# Patient Record
Sex: MALE | URBAN - METROPOLITAN AREA
[De-identification: names, ages, dates, MRNs, and addresses within clinical notes are randomized per-mention and may not be internally consistent; named-entity substitution may affect disease eponyms.]

---

## 2017-02-23 ENCOUNTER — APPOINTMENT (RX ONLY)
Age: 47
Setting detail: DERMATOLOGY
End: 2017-02-23

## 2017-02-23 DIAGNOSIS — L73.2 HIDRADENITIS SUPPURATIVA: ICD-10-CM | Status: IMPROVED

## 2017-02-23 DIAGNOSIS — L71.8 OTHER ROSACEA: ICD-10-CM | Status: STABLE

## 2017-02-23 DIAGNOSIS — Z79.899 OTHER LONG TERM (CURRENT) DRUG THERAPY: ICD-10-CM

## 2017-02-23 PROBLEM — I10 ESSENTIAL (PRIMARY) HYPERTENSION: Status: ACTIVE | Noted: 2017-02-23

## 2017-02-23 PROBLEM — L70.0 ACNE VULGARIS: Status: ACTIVE | Noted: 2017-02-23

## 2017-02-23 PROCEDURE — ? TREATMENT REGIMEN

## 2017-02-23 PROCEDURE — ? VENIPUNCTURE

## 2017-02-23 PROCEDURE — ? HUMIRA MONITORING

## 2017-02-23 PROCEDURE — ? ORDER TESTS

## 2017-02-23 PROCEDURE — 99214 OFFICE O/P EST MOD 30 MIN: CPT | Mod: 25

## 2017-02-23 PROCEDURE — 36415 COLL VENOUS BLD VENIPUNCTURE: CPT

## 2017-02-23 PROCEDURE — ? COUNSELING

## 2017-02-23 ASSESSMENT — SEVERITY ASSESSMENT OVERALL AMONG ALL PATIENTS: IN YOUR EXPERIENCE, AMONG ALL PATIENTS YOU HAVE SEEN WITH THIS CONDITION, HOW SEVERE IS THIS PATIENT'S CONDITION?: MILD

## 2017-02-23 ASSESSMENT — LOCATION ZONE DERM
LOCATION ZONE: AXILLAE
LOCATION ZONE: FACE

## 2017-02-23 ASSESSMENT — LOCATION SIMPLE DESCRIPTION DERM
LOCATION SIMPLE: RIGHT AXILLARY VAULT
LOCATION SIMPLE: RIGHT FOREHEAD
LOCATION SIMPLE: LEFT AXILLARY VAULT

## 2017-02-23 ASSESSMENT — LOCATION DETAILED DESCRIPTION DERM
LOCATION DETAILED: RIGHT MEDIAL FOREHEAD
LOCATION DETAILED: LEFT AXILLARY VAULT
LOCATION DETAILED: RIGHT AXILLARY VAULT

## 2017-02-23 NOTE — HPI: MEDICATION (HUMIRA)
Is This A New Presentation, Or A Follow-Up?: Follow Up Humira
How Severe Is It?: mild
How Many Months Of Humira Have You Completed?: 5

## 2017-10-05 ENCOUNTER — APPOINTMENT (RX ONLY)
Age: 47
Setting detail: DERMATOLOGY
End: 2017-10-05

## 2017-10-05 DIAGNOSIS — L73.2 HIDRADENITIS SUPPURATIVA: ICD-10-CM | Status: INADEQUATELY CONTROLLED

## 2017-10-05 DIAGNOSIS — L71.8 OTHER ROSACEA: ICD-10-CM | Status: STABLE

## 2017-10-05 DIAGNOSIS — Z79.899 OTHER LONG TERM (CURRENT) DRUG THERAPY: ICD-10-CM

## 2017-10-05 PROCEDURE — ? TREATMENT REGIMEN

## 2017-10-05 PROCEDURE — ? INJECTION

## 2017-10-05 PROCEDURE — 99214 OFFICE O/P EST MOD 30 MIN: CPT | Mod: 25

## 2017-10-05 PROCEDURE — ? PRESCRIPTION

## 2017-10-05 PROCEDURE — ? ORDER TESTS

## 2017-10-05 PROCEDURE — ? HUMIRA MONITORING

## 2017-10-05 PROCEDURE — ? COUNSELING

## 2017-10-05 PROCEDURE — 96372 THER/PROPH/DIAG INJ SC/IM: CPT

## 2017-10-05 RX ORDER — ADALIMUMAB 40MG/0.8ML
KIT SUBCUTANEOUS
Qty: 6 | Refills: 0 | Status: ERX

## 2017-10-05 ASSESSMENT — LOCATION DETAILED DESCRIPTION DERM
LOCATION DETAILED: PERIUMBILICAL SKIN
LOCATION DETAILED: RIGHT MEDIAL FOREHEAD
LOCATION DETAILED: LEFT LATERAL ABDOMEN
LOCATION DETAILED: LEFT INGUINAL CREASE
LOCATION DETAILED: RIGHT INGUINAL CREASE

## 2017-10-05 ASSESSMENT — LOCATION SIMPLE DESCRIPTION DERM
LOCATION SIMPLE: ABDOMEN
LOCATION SIMPLE: GROIN
LOCATION SIMPLE: RIGHT FOREHEAD

## 2017-10-05 ASSESSMENT — LOCATION ZONE DERM
LOCATION ZONE: FACE
LOCATION ZONE: TRUNK

## 2017-10-05 ASSESSMENT — SEVERITY ASSESSMENT OVERALL AMONG ALL PATIENTS: IN YOUR EXPERIENCE, AMONG ALL PATIENTS YOU HAVE SEEN WITH THIS CONDITION, HOW SEVERE IS THIS PATIENT'S CONDITION?: MILD

## 2017-10-05 NOTE — PROCEDURE: INJECTION
Units: mg
Treatment Number: 0
Expiration Date (Optional): 06/18
Administered By (Optional): DELBERT Sandra
Route: SC
Medication (1) And Associated J-Code Units: Adalimumab (Humira), 20mg
Bill J-Code: yes
Procedure Information: Please note that the numeric value listed in the Medication (1) and associated J-code units and Medication (2) and associated J-code units variables are j-code amounts and do not represent either the concentration or the total amount of the medications injected.  I strongly recommend selecting no to the Render J-code information in note question. This will allow your note to be more clear. If you are billing j-codes with your injection codes you need to document the total amount of the medication injected. This amount should match the j-code units. For example, if you are injecting Triamcinolone 40mg as an intramuscular injection you would select 40 for the dose field and mg for the units. This would allow you to document  with 4 units (40mg = 10mg x 4). The total volume is not used to calculate j-codes only the amount of the medication administered.
Detail Level: None
Post-Care Instructions: I reviewed with the patient in detail post-care instructions. Patient understands to keep the injection sites clean and call the clinic if there is any redness, swelling or pain.
Render J-Code Information In Note?: no
Consent: The risks of the medication was reviewed with the patient.
Dose Administered (Numbers Only - Mg, G, Mcg, Units, Cc): 80
Lot # (Optional): [id number]

## 2018-01-24 ENCOUNTER — APPOINTMENT (RX ONLY)
Age: 48
Setting detail: DERMATOLOGY
End: 2018-01-24

## 2018-01-24 DIAGNOSIS — L73.2 HIDRADENITIS SUPPURATIVA: ICD-10-CM

## 2018-01-24 DIAGNOSIS — L71.8 OTHER ROSACEA: ICD-10-CM | Status: STABLE

## 2018-01-24 PROCEDURE — ? COUNSELING

## 2018-01-24 PROCEDURE — 99213 OFFICE O/P EST LOW 20 MIN: CPT

## 2018-01-24 PROCEDURE — ? TREATMENT REGIMEN

## 2018-01-24 PROCEDURE — ? HUMIRA MONITORING

## 2018-01-24 ASSESSMENT — LOCATION SIMPLE DESCRIPTION DERM
LOCATION SIMPLE: GROIN
LOCATION SIMPLE: LEFT BUTTOCK
LOCATION SIMPLE: RIGHT FOREHEAD
LOCATION SIMPLE: RIGHT BUTTOCK

## 2018-01-24 ASSESSMENT — LOCATION ZONE DERM
LOCATION ZONE: TRUNK
LOCATION ZONE: FACE

## 2018-01-24 ASSESSMENT — LOCATION DETAILED DESCRIPTION DERM
LOCATION DETAILED: RIGHT MEDIAL FOREHEAD
LOCATION DETAILED: RIGHT INGUINAL CREASE
LOCATION DETAILED: LEFT MEDIAL BUTTOCK
LOCATION DETAILED: RIGHT MEDIAL BUTTOCK
LOCATION DETAILED: LEFT INGUINAL CREASE

## 2018-01-24 ASSESSMENT — SEVERITY ASSESSMENT OVERALL AMONG ALL PATIENTS: IN YOUR EXPERIENCE, AMONG ALL PATIENTS YOU HAVE SEEN WITH THIS CONDITION, HOW SEVERE IS THIS PATIENT'S CONDITION?: MILD

## 2018-04-26 ENCOUNTER — APPOINTMENT (RX ONLY)
Age: 48
Setting detail: DERMATOLOGY
End: 2018-04-26

## 2018-04-26 DIAGNOSIS — L73.2 HIDRADENITIS SUPPURATIVA: ICD-10-CM | Status: INADEQUATELY CONTROLLED

## 2018-04-26 DIAGNOSIS — L71.8 OTHER ROSACEA: ICD-10-CM | Status: STABLE

## 2018-04-26 PROCEDURE — 99214 OFFICE O/P EST MOD 30 MIN: CPT

## 2018-04-26 PROCEDURE — ? TREATMENT REGIMEN

## 2018-04-26 PROCEDURE — ? HUMIRA MONITORING

## 2018-04-26 ASSESSMENT — LOCATION DETAILED DESCRIPTION DERM
LOCATION DETAILED: RIGHT INGUINAL CREASE
LOCATION DETAILED: RIGHT INFERIOR MEDIAL FOREHEAD
LOCATION DETAILED: LEFT INGUINAL CREASE

## 2018-04-26 ASSESSMENT — LOCATION ZONE DERM
LOCATION ZONE: FACE
LOCATION ZONE: TRUNK

## 2018-04-26 ASSESSMENT — SEVERITY ASSESSMENT OVERALL AMONG ALL PATIENTS: IN YOUR EXPERIENCE, AMONG ALL PATIENTS YOU HAVE SEEN WITH THIS CONDITION, HOW SEVERE IS THIS PATIENT'S CONDITION?: MILD

## 2018-04-26 ASSESSMENT — LOCATION SIMPLE DESCRIPTION DERM
LOCATION SIMPLE: GROIN
LOCATION SIMPLE: RIGHT FOREHEAD

## 2018-04-26 ASSESSMENT — HURLEY STAGE
IN YOUR EXPERIENCE, AMONG ALL PATIENTS YOU HAVE SEEN WITH THIS CONDITION, HOW SEVERE IS THIS PATIENT'S CONDITION?: HURLEY STAGE II: SINGLE OR MULTIPLE, WIDELY SEPARATED RECURRENT ABSCESSES WITH SINUS TRACT FORMATION AND SCARRING

## 2018-04-26 NOTE — HPI: MEDICATION (HUMIRA)
Is This A New Presentation, Or A Follow-Up?: Follow Up Humira
How Severe Is It?: mild
Additional History: Patient states he was diagnosed with Pneumonia in January and d/c Humira for 2 months as per recommendation of his PCP. Patient restarted Humira in March, gave himself a loading dose (2 pens) and and once every other week since he doesn’t feel comfortable doing Humira once a week once he was diagnosed with Pneumonia, but will restart Humira weekly again. Patient states HS was well controlled until he d/c Humira and is currently flaring on the groin and buttock.

## 2018-04-26 NOTE — PROCEDURE: TREATMENT REGIMEN
Detail Level: Zone
Plan: Patient okay’d release of medical records with recent CBC and CMP
Continue Regimen: Humira, increase to 1 pen subcutaneously once a week
Detail Level: Detailed
Continue Regimen: Metrogel and Mirvaso daily. \\nDoxycycline 200mg 1 po qd

## 2018-05-30 ENCOUNTER — APPOINTMENT (RX ONLY)
Age: 48
Setting detail: DERMATOLOGY
End: 2018-05-30

## 2018-05-30 DIAGNOSIS — Z79.899 OTHER LONG TERM (CURRENT) DRUG THERAPY: ICD-10-CM

## 2018-05-30 PROCEDURE — ? ORDER TESTS

## 2019-07-11 ENCOUNTER — HOSPITAL ENCOUNTER (OUTPATIENT)
Age: 49
Discharge: HOME | End: 2019-07-11
Payer: COMMERCIAL

## 2019-07-11 LAB
INR BLD: 1.29 (ref 0.86–1.14)
PROTHROMBIN TIME: 14.7 SEC (ref 9.8–13)

## 2019-07-11 PROCEDURE — 82397 CHEMILUMINESCENT ASSAY: CPT

## 2019-07-11 PROCEDURE — 85610 PROTHROMBIN TIME: CPT

## 2019-07-11 PROCEDURE — 86140 C-REACTIVE PROTEIN: CPT

## 2019-07-11 PROCEDURE — 80074 ACUTE HEPATITIS PANEL: CPT

## 2019-07-11 PROCEDURE — 80299 QUANTITATIVE ASSAY DRUG: CPT

## 2019-07-11 PROCEDURE — 80053 COMPREHEN METABOLIC PANEL: CPT

## 2019-07-11 PROCEDURE — 85025 COMPLETE CBC W/AUTO DIFF WBC: CPT

## 2019-07-11 PROCEDURE — 36415 COLL VENOUS BLD VENIPUNCTURE: CPT

## 2019-07-12 LAB
A/G RATIO: 0.9 (ref 1.1–2.2)
ALBUMIN SERPL-MCNC: 3.6 G/DL (ref 3.4–5)
ALP BLD-CCNC: 141 U/L (ref 40–129)
ALT SERPL-CCNC: 14 U/L (ref 10–40)
ANION GAP SERPL CALCULATED.3IONS-SCNC: 14 MMOL/L (ref 3–16)
AST SERPL-CCNC: 14 U/L (ref 15–37)
BASOPHILS ABSOLUTE: 0.1 K/UL (ref 0–0.2)
BASOPHILS RELATIVE PERCENT: 1 %
BILIRUB SERPL-MCNC: <0.2 MG/DL (ref 0–1)
BUN BLDV-MCNC: 13 MG/DL (ref 7–20)
C-REACTIVE PROTEIN: 84.8 MG/L (ref 0–5.1)
CALCIUM SERPL-MCNC: 9.5 MG/DL (ref 8.3–10.6)
CHLORIDE BLD-SCNC: 100 MMOL/L (ref 99–110)
CO2: 22 MMOL/L (ref 21–32)
CREAT SERPL-MCNC: 1 MG/DL (ref 0.9–1.3)
EOSINOPHILS ABSOLUTE: 0.3 K/UL (ref 0–0.6)
EOSINOPHILS RELATIVE PERCENT: 3.5 %
GFR AFRICAN AMERICAN: >60
GFR NON-AFRICAN AMERICAN: >60
GLOBULIN: 4.2 G/DL
GLUCOSE BLD-MCNC: 73 MG/DL (ref 70–99)
HAV IGM SER IA-ACNC: NORMAL
HCT VFR BLD CALC: 30.6 % (ref 40.5–52.5)
HEMOGLOBIN: 9.8 G/DL (ref 13.5–17.5)
HEPATITIS B CORE IGM ANTIBODY: NORMAL
HEPATITIS B SURFACE ANTIGEN INTERPRETATION: NORMAL
HEPATITIS C ANTIBODY INTERPRETATION: NORMAL
LYMPHOCYTES ABSOLUTE: 1.1 K/UL (ref 1–5.1)
LYMPHOCYTES RELATIVE PERCENT: 12.8 %
MCH RBC QN AUTO: 23.9 PG (ref 26–34)
MCHC RBC AUTO-ENTMCNC: 32.1 G/DL (ref 31–36)
MCV RBC AUTO: 74.6 FL (ref 80–100)
MONOCYTES ABSOLUTE: 0.6 K/UL (ref 0–1.3)
MONOCYTES RELATIVE PERCENT: 7.3 %
NEUTROPHILS ABSOLUTE: 6.6 K/UL (ref 1.7–7.7)
NEUTROPHILS RELATIVE PERCENT: 75.4 %
PDW BLD-RTO: 17 % (ref 12.4–15.4)
PLATELET # BLD: 492 K/UL (ref 135–450)
PMV BLD AUTO: 7 FL (ref 5–10.5)
POTASSIUM SERPL-SCNC: 4.2 MMOL/L (ref 3.5–5.1)
RBC # BLD: 4.1 M/UL (ref 4.2–5.9)
SODIUM BLD-SCNC: 136 MMOL/L (ref 136–145)
TOTAL PROTEIN: 7.8 G/DL (ref 6.4–8.2)
WBC # BLD: 8.7 K/UL (ref 4–11)

## 2019-07-13 LAB — MISCELLANEOUS LAB TEST ORDER: NORMAL

## 2019-08-16 ENCOUNTER — HOSPITAL ENCOUNTER (OUTPATIENT)
Dept: MRI IMAGING | Age: 49
Discharge: HOME | End: 2019-08-16
Payer: COMMERCIAL

## 2019-08-16 DIAGNOSIS — M45.6 ANKYLOSING SPONDYLITIS LUMBAR REGION (HCC): ICD-10-CM

## 2019-08-16 DIAGNOSIS — M54.2 CERVICALGIA: ICD-10-CM

## 2019-08-16 PROCEDURE — 72141 MRI NECK SPINE W/O DYE: CPT

## 2019-08-16 PROCEDURE — 72148 MRI LUMBAR SPINE W/O DYE: CPT

## 2019-10-24 ENCOUNTER — HOSPITAL ENCOUNTER (OUTPATIENT)
Age: 49
Discharge: HOME | End: 2019-10-24
Payer: COMMERCIAL

## 2019-10-24 LAB
INR BLD: 1.25 (ref 0.86–1.14)
PROTHROMBIN TIME: 14.2 SEC (ref 9.8–13)

## 2019-10-24 PROCEDURE — 83690 ASSAY OF LIPASE: CPT

## 2019-10-24 PROCEDURE — 85025 COMPLETE CBC W/AUTO DIFF WBC: CPT

## 2019-10-24 PROCEDURE — 80053 COMPREHEN METABOLIC PANEL: CPT

## 2019-10-24 PROCEDURE — 85610 PROTHROMBIN TIME: CPT

## 2019-10-25 LAB
A/G RATIO: 0.8 (ref 1.1–2.2)
ALBUMIN SERPL-MCNC: 3.5 G/DL (ref 3.4–5)
ALP BLD-CCNC: 134 U/L (ref 40–129)
ALT SERPL-CCNC: 11 U/L (ref 10–40)
ANION GAP SERPL CALCULATED.3IONS-SCNC: 13 MMOL/L (ref 3–16)
AST SERPL-CCNC: 12 U/L (ref 15–37)
BASOPHILS ABSOLUTE: 0.1 K/UL (ref 0–0.2)
BASOPHILS RELATIVE PERCENT: 1 %
BILIRUB SERPL-MCNC: 0.3 MG/DL (ref 0–1)
BUN BLDV-MCNC: 10 MG/DL (ref 7–20)
CALCIUM SERPL-MCNC: 9.1 MG/DL (ref 8.3–10.6)
CHLORIDE BLD-SCNC: 98 MMOL/L (ref 99–110)
CO2: 25 MMOL/L (ref 21–32)
CREAT SERPL-MCNC: 1 MG/DL (ref 0.9–1.3)
EOSINOPHILS ABSOLUTE: 0.3 K/UL (ref 0–0.6)
EOSINOPHILS RELATIVE PERCENT: 3.1 %
GFR AFRICAN AMERICAN: >60
GFR NON-AFRICAN AMERICAN: >60
GLOBULIN: 4.6 G/DL
GLUCOSE BLD-MCNC: 91 MG/DL (ref 70–99)
HCT VFR BLD CALC: 35.8 % (ref 40.5–52.5)
HEMOGLOBIN: 11.7 G/DL (ref 13.5–17.5)
LIPASE: 42 U/L (ref 13–60)
LYMPHOCYTES ABSOLUTE: 1 K/UL (ref 1–5.1)
LYMPHOCYTES RELATIVE PERCENT: 10.2 %
MCH RBC QN AUTO: 25.5 PG (ref 26–34)
MCHC RBC AUTO-ENTMCNC: 32.7 G/DL (ref 31–36)
MCV RBC AUTO: 78.1 FL (ref 80–100)
MONOCYTES ABSOLUTE: 0.7 K/UL (ref 0–1.3)
MONOCYTES RELATIVE PERCENT: 6.8 %
NEUTROPHILS ABSOLUTE: 7.6 K/UL (ref 1.7–7.7)
NEUTROPHILS RELATIVE PERCENT: 78.9 %
PDW BLD-RTO: 17.9 % (ref 12.4–15.4)
PLATELET # BLD: 426 K/UL (ref 135–450)
PMV BLD AUTO: 7.5 FL (ref 5–10.5)
POTASSIUM SERPL-SCNC: 4.7 MMOL/L (ref 3.5–5.1)
RBC # BLD: 4.58 M/UL (ref 4.2–5.9)
SODIUM BLD-SCNC: 136 MMOL/L (ref 136–145)
TOTAL PROTEIN: 8.1 G/DL (ref 6.4–8.2)
WBC # BLD: 9.7 K/UL (ref 4–11)

## 2019-12-27 ENCOUNTER — HOSPITAL ENCOUNTER (OUTPATIENT)
Age: 49
Discharge: HOME | End: 2019-12-27
Payer: COMMERCIAL

## 2019-12-27 PROCEDURE — 36415 COLL VENOUS BLD VENIPUNCTURE: CPT

## 2021-11-16 NOTE — FLOWSHEET NOTE
[facility], [address] [place] [address] [address]  Phone: [phone]   Fax:     [phone]      Physical Therapy Treatment Note/ Progress Report:     Date:  2021    Patient Name:  Ammon Fonseca    :  1970  MRN: [id number]  Restrictions/Precautions:    Medical/Treatment Diagnosis Information:  ·  M54.6  Thoracic pain  ·  M54.5  Lumbar pain  · M25.512 L Shoulder pain  Insurance/Certification information:    [facility]  Physician Information:    Hunter Samuel MD  Has the plan of care been signed (Y/N):        []  Yes  [x]  No     Date of Patient follow up with Physician: NS    Is this a Progress Report:     []  Yes  [x]  No     If Yes:  Date Range for reporting period:  Beginnin21 ------------ Endin21    Progress report will be due (10 Rx or 30 days whichever is less):      Recertification will be due (POC Duration  / 90 days whichever is less): 21      Visit # Insurance Allowable Auth Required   In Person 1  []  Yes     []  No    Tele Health 0  []  Yes     []  No    Total 1       Functional Scale: Oswestry 30%   Date assessed:  21      Latex Allergy:  [x]NO      []YES  Preferred Language for Healthcare:   [x]English       []other:    Pain level:  3-4/10     SUBJECTIVE:  See eval    OBJECTIVE: See eval   Observation:    Test measurements:      RESTRICTIONS/PRECAUTIONS: colonoscomy bag  B THR    Exercises/Interventions:   Therapeutic Ex (38662) Sets/sec Reps Notes/CUES HEP   Prone/ILDEFONSO  5 min  x   Prone buttock kicks  15x  x   Prone glut sets  15x  x          Chin tucks   15x  x                                                    Manual Intervention (39647)       Prone leg pull  5 min                                        NMR re-education (44406)   CUES NEEDED    PB rows BL 20x  x                 bridges  15x  x   Supine PB clamshells BL 15x  x          Wall squats  15x  x Therapeutic Activity (16709)                     HP T/L  10 min                   [employer] access code: [email or web address]           Therapeutic Exercise and NMR EXR  [x] (35780) Provided verbal/tactile cueing for activities related to strengthening, flexibility, endurance, ROM  for improvements in scapular, scapulothoracic and UE control with self care, reaching, carrying, lifting, house/yardwork, driving/computer work.    [] (55007) Provided verbal/tactile cueing for activities related to improving balance, coordination, kinesthetic sense, posture, motor skill, proprioception  to assist with  scapular, scapulothoracic and UE control with self care, reaching, carrying, lifting, house/yardwork, driving/computer work. Therapeutic Activities:    [x] (66736 or 13017) Provided verbal/tactile cueing for activities related to improving balance, coordination, kinesthetic sense, posture, motor skill, proprioception and motor activation to allow for proper function of scapular, scapulothoracic and UE control with self care, carrying, lifting, driving/computer work.      Home Exercise Program:    [x] (15287) Reviewed/Progressed HEP activities related to strengthening, flexibility, endurance, ROM of scapular, scapulothoracic and UE control with self care, reaching, carrying, lifting, house/yardwork, driving/computer work  [] (47355) Reviewed/Progressed HEP activities related to improving balance, coordination, kinesthetic sense, posture, motor skill, proprioception of scapular, scapulothoracic and UE control with self care, reaching, carrying, lifting, house/yardwork, driving/computer work      Manual Treatments:  PROM / STM / Oscillations-Mobs:  G-I, II, III, IV (PA's, Inf., Post.)  [x] (45339) Provided manual therapy to mobilize soft tissue/joints of cervical/CT, scapular GHJ and UE for the purpose of modulating pain, promoting relaxation,  increasing ROM, reducing/eliminating soft tissue swelling/inflammation/restriction, increased symptoms or restriction. [x] Progressing: [] Met: [] Not Met: [] Adjusted     5. Stand/ posture with min limitations (patient specific functional goal)    [x] Progressing: [] Met: [] Not Met: [] Adjusted            Overall Progression Towards Functional goals/ Treatment Progress Update:  [] Patient is progressing as expected towards functional goals listed. [] Progression is slowed due to complexities/Impairments listed. [] Progression has been slowed due to co-morbidities. [x] Plan just implemented, too soon to assess goals progression <30days   [] Goals require adjustment due to lack of progress  [] Patient is not progressing as expected and requires additional follow up with physician  [] Other    Prognosis for POC: [x] Good [] Fair  [] Poor      Patient requires continued skilled intervention: [x] Yes  [] No    Treatment/Activity Tolerance:  [x] Patient able to complete treatment  [] Patient limited by fatigue  [] Patient limited by pain    [] Patient limited by other medical complications  [] Other:       PLAN: See eval  [] Continue per plan of care [] Alter current plan (see comments above)  [x] Plan of care initiated [] Hold pending MD visit [] Discharge    Electronically signed by:  Aren Dean PT    Note: If patient does not return for scheduled/ recommended follow up visits, this note will serve as a discharge from care along with most recent update on progress.

## 2021-11-16 NOTE — PLAN OF CARE
[facility], [address] [facility] [address], [address]  Phone: [phone]   Fax:     [phone]                                                       Physical Therapy Certification    Dear   Eligio Beaulieu MD,    We had the pleasure of evaluating the following patient for physical therapy services at [address] [place] [address]. A summary of our findings can be found in the initial assessment below. This includes our plan of care. If you have any questions or concerns regarding these findings, please do not hesitate to contact me at the office phone number checked above. Thank you for the referral.       Physician Signature:_______________________________Date:__________________  By signing above (or electronic signature), therapists plan is approved by physician      Patient: Rashmi Smith   : 1970   MRN: [id number]  Referring Physician:   Eligio Beaulieu MD      Evaluation Date: 2021      Medical Diagnosis Information:  ·   M54.6  Thoracic pain  ·  M54.5  Lumbar pain  M25.512 L Shoulder pain                                             Insurance information:   [facility]    Precautions/ Contra-indications: none      C-SSRS Triggered by Intake questionnaire (Past 2 wk assessment):   [x] No, Questionnaire did not trigger screening.   [] Yes, Patient intake triggered further evaluation      [] C-SSRS Screening completed  [] PCP notified via Plan of Care  [] Emergency services notified     Latex Allergy:  [x]NO      []YES  Preferred Language for Healthcare:   [x]English       []other:    SUBJECTIVE: Patient states insidious onset L shoulder and L/T/ C pain  Greater than 10 years.     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 []Reduced participation in sport/recreation activities. Classification:   []Signs/symptoms consistent with post-surgical status including decreased ROM, strength and function.   []Signs/symptoms consistent with joint sprain/strain   []Signs/symptoms consistent with shoulder impingement   []Signs/symptoms consistent with shoulder/elbow/wrist tendinopathy   []Signs/symptoms consistent with Rotator cuff tear   []Signs/symptoms consistent with labral tear   []Signs/symptoms consistent with postural dysfunction    []Signs/symptoms consistent with Glenohumeral IR Deficit - <45 degrees   []Signs/symptoms consistent with facet dysfunction of cervical/thoracic spine    []Signs/symptoms consistent with pathology which may benefit from Dry needling     [x]other: T/L DDD/ pain    Prognosis/Rehab Potential:      []Excellent   [x]Good    []Fair   []Poor    Tolerance of evaluation/treatment:    []Excellent   [x]Good    []Fair   []Poor    Physical Therapy Evaluation Complexity Justification  [x] A history of present problem with:  [] no personal factors and/or comorbidities that impact the plan of care;  [x]1-2 personal factors and/or comorbidities that impact the plan of care  []3 personal factors and/or comorbidities that impact the plan of care  [x] An examination of body systems using standardized tests and measures addressing any of the following: body structures and functions (impairments), activity limitations, and/or participation restrictions;:  [x] a total of 1-2 or more elements   [] a total of 3 or more elements   [] a total of 4 or more elements   [x] A clinical presentation with:  [x] stable and/or uncomplicated characteristics   [] evolving clinical presentation with changing characteristics  [] unstable and unpredictable characteristics;   [x] Clinical decision making of [x] low, [] moderate, [] high complexity using standardized patient assessment instrument and/or measurable assessment of functional outcome. [x] EVAL (LOW) 79554 (typically 20 minutes face-to-face)  [] EVAL (MOD) 96830 (typically 30 minutes face-to-face)  [] EVAL (HIGH) 29765 (typically 45 minutes face-to-face)  [] RE-EVAL     PLAN:  Frequency/Duration:  1-2days per week for 6-8 Weeks:  INTERVENTIONS:  [x] Therapeutic exercise including: strength training, ROM, for Upper extremity and core   [x]  NMR activation and proprioception for UE, scap and Core   [x] Manual therapy as indicated for shoulder, scapula and spine to include: Dry Needling/IASTM, STM, PROM, Gr I-IV mobilizations, manipulation. [x] Modalities as needed that may include: thermal agents, E-stim, Biofeedback, US, iontophoresis as indicated  [x] Patient education on joint protection, postural re-education, activity modification, progression of HEP. HEP instruction: Refer to [address] [place] [address] access code and exercises on the 1st visit treatment note    GOALS:  Patient stated goal: posture/ stand     Therapist goals for Patient:   Short Term Goals: To be achieved in: 2 weeks  1. Independent in HEP and progression per patient tolerance, in order to prevent re-injury. [x] Progressing: [] Met: [] Not Met: [] Adjusted     2. Patient will have a decrease in pain to facilitate improvement in movement, function, and ADLs as indicated by Functional Deficits. [x] Progressing: [] Met: [] Not Met: [] Adjusted     Long Term Goals: To be achieved in: 6-8 weeks  1. Disability index score of 15% or less for the DASH to assist with reaching prior level of function. [x] Progressing: [] Met: [] Not Met: [] Adjusted     2. Patient will demonstrate increased AROM to WNL to allow for proper joint functioning as indicated by patients Functional Deficits. [x] Progressing: [] Met: [] Not Met: [] Adjusted     3. Patient will demonstrate an increase in Strength to 5/5 to allow for proper functional mobility as indicated by patients Functional Deficits. [x] Progressing: [] Met: [] Not Met: [] Adjusted     4. Patient will return to [facility] for  functional activities without increased symptoms or restriction. [x] Progressing: [] Met: [] Not Met: [] Adjusted     5.  Stand/ posture with min limitations (patient specific functional goal)    [x] Progressing: [] Met: [] Not Met: [] Adjusted      Electronically signed by:  Kwan Alves PT

## 2021-11-17 ENCOUNTER — HOSPITAL ENCOUNTER (OUTPATIENT)
Dept: PHYSICAL THERAPY | Age: 51
Setting detail: THERAPIES SERIES
Discharge: HOME | End: 2021-11-17
Payer: COMMERCIAL

## 2021-11-17 PROCEDURE — 97110 THERAPEUTIC EXERCISES: CPT | Performed by: SPECIALIST

## 2021-11-17 PROCEDURE — 97112 NEUROMUSCULAR REEDUCATION: CPT | Performed by: SPECIALIST

## 2021-11-17 PROCEDURE — 97161 PT EVAL LOW COMPLEX 20 MIN: CPT | Performed by: SPECIALIST

## 2021-12-01 ENCOUNTER — HOSPITAL ENCOUNTER (OUTPATIENT)
Dept: PHYSICAL THERAPY | Age: 51
Setting detail: THERAPIES SERIES
Discharge: HOME | End: 2021-12-01
Payer: COMMERCIAL

## 2021-12-01 PROCEDURE — 97112 NEUROMUSCULAR REEDUCATION: CPT | Performed by: SPECIALIST/TECHNOLOGIST

## 2021-12-01 PROCEDURE — 97110 THERAPEUTIC EXERCISES: CPT | Performed by: SPECIALIST/TECHNOLOGIST

## 2021-12-01 NOTE — FLOWSHEET NOTE
[facility], [address] [place] [address], [address] [place] [address] [address]  Phone: [phone]   Fax:     [phone]      Physical Therapy Treatment Note/ Progress Report:     Date:  2021    Patient Name:  Mode Yanes    :  1970  MRN: [id number]  Restrictions/Precautions:    Medical/Treatment Diagnosis Information:  ·  M54.6  Thoracic pain  ·  M54.5  Lumbar pain  · M25.512 L Shoulder pain  Insurance/Certification information:    R  Physician Information:    Emma Brown MD  Has the plan of care been signed (Y/N):        []  Yes  [x]  No     Date of Patient follow up with Physician: NS    Is this a Progress Report:     []  Yes  [x]  No     If Yes:  Date Range for reporting period:  Beginnin21 ------------ Endin21    Progress report will be due (10 Rx or 30 days whichever is less):      Recertification will be due (POC Duration  / 90 days whichever is less): 21      Visit # Insurance Allowable Auth Required   In Person 2  []  Yes     []  No    Tele Health 0  []  Yes     []  No    Total 2       Functional Scale: Oswestry 30%   Date assessed:  21      Latex Allergy:  [x]NO      []YES  Preferred Language for Healthcare:   [x]English       []other:    Pain level:  3-4/10     SUBJECTIVE:  Pt notes that he has not been consistent with his HEP. Overall he is feeling ok.      OBJECTIVE:    Observation:    Test measurements:      RESTRICTIONS/PRECAUTIONS: colonoscomy bag  B THR    Exercises/Interventions:   Therapeutic Ex (67785) Sets/sec Reps Notes/CUES HEP   Prone/ILDEFONSO  5 min  x   Prone buttock kicks  20x  x   Prone glut sets  20x  x          Chin tucks   20x  x          Prone press up   x10                                        Manual Intervention (26974)       Prone leg pull  5 min                                        NMR re-education (89195)   CUES NEEDED    PB rows BL 20x  x relaxation,  increasing ROM, reducing/eliminating soft tissue swelling/inflammation/restriction, improving soft tissue extensibility and allowing for proper ROM for normal function with self care, reaching, carrying, lifting, house/yardwork, driving/computer work    Modalities:     [x] GAME READY (VASO)- for significant edema, swelling, pain control. Charges:  Timed Code Treatment Minutes: 40   Total Treatment Minutes:  50   BWC:  TE TIME:  NMR TIME:  MANUAL TIME:  UNTIMED MINUTES:  Medicare Total:         [] EVAL (LOW) 12662 (typically 20 minutes face-to-face)  [] EVAL (MOD) 02976 (typically 30 minutes face-to-face)  [] EVAL (HIGH) 39628 (typically 45 minutes face-to-face)  [] RE-EVAL     [x] QI(56895) x  2  [] IONTO  [x] NMR (03938) x  1   [] VASO  [] Manual (02190) x     [x] Other:HP  [] TA x      [] Mech Traction (20027)  [] ES(attended) (80316)      [] ES (un) (74000):    ASSESSMENT:  Tolerated tx well. GOALS:      Patient stated goal: posture/ stand     Therapist goals for Patient:   Short Term Goals: To be achieved in: 2 weeks  1. Independent in HEP and progression per patient tolerance, in order to prevent re-injury. [x] Progressing: [] Met: [] Not Met: [] Adjusted     2. Patient will have a decrease in pain to facilitate improvement in movement, function, and ADLs as indicated by Functional Deficits. [x] Progressing: [] Met: [] Not Met: [] Adjusted     Long Term Goals: To be achieved in: 6-8 weeks  1. Disability index score of 15% or less for the DASH to assist with reaching prior level of function. [x] Progressing: [] Met: [] Not Met: [] Adjusted     2. Patient will demonstrate increased AROM to WNL to allow for proper joint functioning as indicated by patients Functional Deficits. [x] Progressing: [] Met: [] Not Met: [] Adjusted     3. Patient will demonstrate an increase in Strength to 5/5 to allow for proper functional mobility as indicated by patients Functional Deficits.    [x] Progressing: [] Met: [] Not Met: [] Adjusted     4. Patient will return to [facility] for  functional activities without increased symptoms or restriction. [x] Progressing: [] Met: [] Not Met: [] Adjusted     5. Stand/ posture with min limitations (patient specific functional goal)    [x] Progressing: [] Met: [] Not Met: [] Adjusted            Overall Progression Towards Functional goals/ Treatment Progress Update:  [] Patient is progressing as expected towards functional goals listed. [] Progression is slowed due to complexities/Impairments listed. [] Progression has been slowed due to co-morbidities. [x] Plan just implemented, too soon to assess goals progression <30days   [] Goals require adjustment due to lack of progress  [] Patient is not progressing as expected and requires additional follow up with physician  [] Other    Prognosis for POC: [x] Good [] Fair  [] Poor      Patient requires continued skilled intervention: [x] Yes  [] No    Treatment/Activity Tolerance:  [x] Patient able to complete treatment  [] Patient limited by fatigue  [] Patient limited by pain    [] Patient limited by other medical complications  [] Other:       PLAN: See eval  [] Continue per plan of care [] Alter current plan (see comments above)  [x] Plan of care initiated [] Hold pending MD visit [] Discharge    Electronically signed by:  Miky Gandara PTA , ATC  Note: If patient does not return for scheduled/ recommended follow up visits, this note will serve as a discharge from care along with most recent update on progress.

## 2021-12-03 ENCOUNTER — HOSPITAL ENCOUNTER (OUTPATIENT)
Dept: PHYSICAL THERAPY | Age: 51
Setting detail: THERAPIES SERIES
End: 2021-12-03
Payer: COMMERCIAL

## 2021-12-07 ENCOUNTER — HOSPITAL ENCOUNTER (OUTPATIENT)
Dept: PHYSICAL THERAPY | Age: 51
Setting detail: THERAPIES SERIES
Discharge: HOME | End: 2021-12-07
Payer: COMMERCIAL

## 2021-12-07 NOTE — FLOWSHEET NOTE
[facility], [address] [place] [address], [address] [place] [address] [address]  Phone: [phone]   Fax:     [phone]    Physical Therapy  Cancellation/No-show Note  Patient Name:  Bindu Ellington  :  1970   Date:  2021    Cancelled visits to date: 0  No-shows to date: 1    For today's appointment patient:  []  Cancelled  []  Rescheduled appointment  [x]  No-show     Reason given by patient:  []  Patient ill  []  Conflicting appointment  []  No transportation    []  Conflict with work  [x]  No reason given  []  Other:     Comments:      Phone call information:   []  Phone call made today to patient at am/pm at the number provided:      []  Patient answered, conversation as follows:    []  Patient did not answer, message left as follows:  [x]  Phone call not needed - pt contacted us to cancel and provided reason for cancellation.      Electronically signed by:  Geovanna Marin PTA, ATC

## 2021-12-10 ENCOUNTER — HOSPITAL ENCOUNTER (OUTPATIENT)
Dept: PHYSICAL THERAPY | Age: 51
Setting detail: THERAPIES SERIES
Discharge: HOME | End: 2021-12-10
Payer: COMMERCIAL

## 2021-12-10 PROCEDURE — 97112 NEUROMUSCULAR REEDUCATION: CPT | Performed by: SPECIALIST/TECHNOLOGIST

## 2021-12-10 PROCEDURE — 97140 MANUAL THERAPY 1/> REGIONS: CPT | Performed by: SPECIALIST/TECHNOLOGIST

## 2021-12-10 PROCEDURE — 97110 THERAPEUTIC EXERCISES: CPT | Performed by: SPECIALIST/TECHNOLOGIST

## 2021-12-10 NOTE — FLOWSHEET NOTE
[facility], [address] [place] [address], [address] [place] [address] [address]  Phone: [phone]   Fax:     [phone]      Physical Therapy Treatment Note/ Progress Report:     Date:  12/10/2021    Patient Name:  Hamilton Shah    :  1970  MRN: [id number]  Restrictions/Precautions:    Medical/Treatment Diagnosis Information:  ·  M54.6  Thoracic pain  ·  M54.5  Lumbar pain  · M25.512 L Shoulder pain  Insurance/Certification information:    [facility]  Physician Information:    Nikki Alegria MD  Has the plan of care been signed (Y/N):        []  Yes  [x]  No     Date of Patient follow up with Physician: NS    Is this a Progress Report:     []  Yes  [x]  No     If Yes:  Date Range for reporting period:  Beginnin21 ------------ Endin21    Progress report will be due (10 Rx or 30 days whichever is less):      Recertification will be due (POC Duration  / 90 days whichever is less): 21      Visit # Insurance Allowable Auth Required   In Person 3  []  Yes     []  No    Tele Health 0  []  Yes     []  No    Total 3       Functional Scale: Oswestry 30%   Date assessed:  21      Latex Allergy:  [x]NO      []YES  Preferred Language for Healthcare:   [x]English       []other:    Pain level:  3-4/10     SUBJECTIVE:  Pt notes that he has been feeling ok. Notes he had a massage yesterday.      OBJECTIVE:    Observation:    Test measurements:      RESTRICTIONS/PRECAUTIONS: colonoscomy bag  B THR    Exercises/Interventions:   Therapeutic Ex (49013) Sets/sec Reps Notes/CUES HEP   Prone/ILDEFONSO  5 min  x   Prone buttock kicks  20x  x   Prone glut sets  20x  x          Chin tucks   20x  x          Prone press up   x20                                        Manual Intervention (80727)       Prone leg pull  5 min                                        NMR re-education (28271)   CUES NEEDED    PB rows BL 20x  x                 bridges  15x x   Supine PB clamshells BL 20x  x          Wall squats  15x  x                 Therapeutic Activity (12682)                     HP T/L  10 min                   Naytev access code: [email or web address]           Therapeutic Exercise and NMR EXR  [x] (94687) Provided verbal/tactile cueing for activities related to strengthening, flexibility, endurance, ROM  for improvements in scapular, scapulothoracic and UE control with self care, reaching, carrying, lifting, house/yardwork, driving/computer work.    [] (49893) Provided verbal/tactile cueing for activities related to improving balance, coordination, kinesthetic sense, posture, motor skill, proprioception  to assist with  scapular, scapulothoracic and UE control with self care, reaching, carrying, lifting, house/yardwork, driving/computer work. Therapeutic Activities:    [x] (49637 or 03888) Provided verbal/tactile cueing for activities related to improving balance, coordination, kinesthetic sense, posture, motor skill, proprioception and motor activation to allow for proper function of scapular, scapulothoracic and UE control with self care, carrying, lifting, driving/computer work.      Home Exercise Program:    [x] (31668) Reviewed/Progressed HEP activities related to strengthening, flexibility, endurance, ROM of scapular, scapulothoracic and UE control with self care, reaching, carrying, lifting, house/yardwork, driving/computer work  [] (85308) Reviewed/Progressed HEP activities related to improving balance, coordination, kinesthetic sense, posture, motor skill, proprioception of scapular, scapulothoracic and UE control with self care, reaching, carrying, lifting, house/yardwork, driving/computer work      Manual Treatments:  PROM / STM / Oscillations-Mobs:  G-I, II, III, IV (PA's, Inf., Post.)  [x] (49656) Provided manual therapy to mobilize soft tissue/joints of cervical/CT, scapular GHJ and UE for the purpose of modulating pain, promoting relaxation,  increasing ROM, reducing/eliminating soft tissue swelling/inflammation/restriction, improving soft tissue extensibility and allowing for proper ROM for normal function with self care, reaching, carrying, lifting, house/yardwork, driving/computer work    Modalities:     [x] GAME READY (VASO)- for significant edema, swelling, pain control. Charges:  Timed Code Treatment Minutes: 40   Total Treatment Minutes:  50   BWC:  TE TIME:  NMR TIME:  MANUAL TIME:  UNTIMED MINUTES:  Medicare Total:         [] EVAL (LOW) 79869 (typically 20 minutes face-to-face)  [] EVAL (MOD) 64781 (typically 30 minutes face-to-face)  [] EVAL (HIGH) 60870 (typically 45 minutes face-to-face)  [] RE-EVAL     [x] SM(48766) x  2  [] IONTO  [x] NMR (27019) x  1   [] VASO  [] Manual (52687) x     [x] Other:HP  [] TA x      [] Mech Traction (58007)  [] ES(attended) (23470)      [] ES (un) (41147):    ASSESSMENT:  Tolerated tx well. GOALS:      Patient stated goal: posture/ stand     Therapist goals for Patient:   Short Term Goals: To be achieved in: 2 weeks  1. Independent in HEP and progression per patient tolerance, in order to prevent re-injury. [x] Progressing: [] Met: [] Not Met: [] Adjusted     2. Patient will have a decrease in pain to facilitate improvement in movement, function, and ADLs as indicated by Functional Deficits. [x] Progressing: [] Met: [] Not Met: [] Adjusted     Long Term Goals: To be achieved in: 6-8 weeks  1. Disability index score of 15% or less for the DASH to assist with reaching prior level of function. [x] Progressing: [] Met: [] Not Met: [] Adjusted     2. Patient will demonstrate increased AROM to WNL to allow for proper joint functioning as indicated by patients Functional Deficits. [x] Progressing: [] Met: [] Not Met: [] Adjusted     3. Patient will demonstrate an increase in Strength to 5/5 to allow for proper functional mobility as indicated by patients Functional Deficits.    [x] Progressing: [] Met: [] Not Met: [] Adjusted     4. Patient will return to [facility] for  functional activities without increased symptoms or restriction. [x] Progressing: [] Met: [] Not Met: [] Adjusted     5. Stand/ posture with min limitations (patient specific functional goal)    [x] Progressing: [] Met: [] Not Met: [] Adjusted            Overall Progression Towards Functional goals/ Treatment Progress Update:  [] Patient is progressing as expected towards functional goals listed. [] Progression is slowed due to complexities/Impairments listed. [] Progression has been slowed due to co-morbidities. [x] Plan just implemented, too soon to assess goals progression <30days   [] Goals require adjustment due to lack of progress  [] Patient is not progressing as expected and requires additional follow up with physician  [] Other    Prognosis for POC: [x] Good [] Fair  [] Poor      Patient requires continued skilled intervention: [x] Yes  [] No    Treatment/Activity Tolerance:  [x] Patient able to complete treatment  [] Patient limited by fatigue  [] Patient limited by pain    [] Patient limited by other medical complications  [] Other:       PLAN: See eval  [] Continue per plan of care [] Alter current plan (see comments above)  [x] Plan of care initiated [] Hold pending MD visit [] Discharge    Electronically signed by:  Amanda Bauman PTA , ATC  Note: If patient does not return for scheduled/ recommended follow up visits, this note will serve as a discharge from care along with most recent update on progress.

## 2021-12-16 ENCOUNTER — HOSPITAL ENCOUNTER (OUTPATIENT)
Dept: PHYSICAL THERAPY | Age: 51
Setting detail: THERAPIES SERIES
Discharge: HOME | End: 2021-12-16
Payer: COMMERCIAL

## 2021-12-16 PROCEDURE — 97112 NEUROMUSCULAR REEDUCATION: CPT | Performed by: SPECIALIST/TECHNOLOGIST

## 2021-12-16 PROCEDURE — 97110 THERAPEUTIC EXERCISES: CPT | Performed by: SPECIALIST/TECHNOLOGIST

## 2021-12-16 NOTE — FLOWSHEET NOTE
[address] [facility], [address] [place] [address], [address] [place] [address] [address]  Phone: [phone]   Fax:     [phone]      Physical Therapy Treatment Note/ Progress Report:     Date:  2021    Patient Name:  Hilaria Díaz    :  1970  MRN: [id number]  Restrictions/Precautions:    Medical/Treatment Diagnosis Information:  ·  M54.6  Thoracic pain  ·  M54.5  Lumbar pain  · M25.512 L Shoulder pain  Insurance/Certification information:    R  Physician Information:    Millcient Moreau MD  Has the plan of care been signed (Y/N):        []  Yes  [x]  No     Date of Patient follow up with Physician: NS    Is this a Progress Report:     []  Yes  [x]  No     If Yes:  Date Range for reporting period:  Beginnin21 ------------ Endin21    Progress report will be due (10 Rx or 30 days whichever is less):      Recertification will be due (POC Duration  / 90 days whichever is less): 21      Visit # Insurance Allowable Auth Required   In Person 4  []  Yes     []  No    Tele Health 0  []  Yes     []  No    Total 4       Functional Scale: Oswestry 30%   Date assessed:  21      Latex Allergy:  [x]NO      []YES  Preferred Language for Healthcare:   [x]English       []other:    Pain level:  3-4/10     SUBJECTIVE:  Pt notes that his hip has been bothering him. Notes he has been doing his HEP.      OBJECTIVE:    Observation:    Test measurements:      RESTRICTIONS/PRECAUTIONS: colonoscomy bag  B THR    Exercises/Interventions:   Therapeutic Ex (20757) Sets/sec Reps Notes/CUES HEP    x    x    x          Chin tucks   20x  x              LTR 10\" x10     Posterior pelvic tilt  10\" x10     Posterior tilt with alt steps  x10      Posterior tilt with arms oh x10      Fig 4 stretch 10\"  x10 ea     Manual Intervention (80642)       Prone leg pull  5 min                                        NMR re-education (04967)   CUES NEEDED PB rows BL 20x  x                 bridges  15x  x   Supine PB clamshells BL 20x  x          Wall squats  15x  x                 Therapeutic Activity (64623)                     HP T/L  10 min                   Medbridge access code: [email or web address]           Therapeutic Exercise and NMR EXR  [x] (54408) Provided verbal/tactile cueing for activities related to strengthening, flexibility, endurance, ROM  for improvements in scapular, scapulothoracic and UE control with self care, reaching, carrying, lifting, house/yardwork, driving/computer work.    [] (00147) Provided verbal/tactile cueing for activities related to improving balance, coordination, kinesthetic sense, posture, motor skill, proprioception  to assist with  scapular, scapulothoracic and UE control with self care, reaching, carrying, lifting, house/yardwork, driving/computer work. Therapeutic Activities:    [x] (93720 or 55812) Provided verbal/tactile cueing for activities related to improving balance, coordination, kinesthetic sense, posture, motor skill, proprioception and motor activation to allow for proper function of scapular, scapulothoracic and UE control with self care, carrying, lifting, driving/computer work.      Home Exercise Program:    [x] (37686) Reviewed/Progressed HEP activities related to strengthening, flexibility, endurance, ROM of scapular, scapulothoracic and UE control with self care, reaching, carrying, lifting, house/yardwork, driving/computer work  [] (54537) Reviewed/Progressed HEP activities related to improving balance, coordination, kinesthetic sense, posture, motor skill, proprioception of scapular, scapulothoracic and UE control with self care, reaching, carrying, lifting, house/yardwork, driving/computer work      Manual Treatments:  PROM / STM / Oscillations-Mobs:  G-I, II, III, IV (PA's, Inf., Post.)  [x] (29832) Provided manual therapy to mobilize soft tissue/joints of cervical/CT, scapular GHJ and UE for the purpose of modulating pain, promoting relaxation,  increasing ROM, reducing/eliminating soft tissue swelling/inflammation/restriction, improving soft tissue extensibility and allowing for proper ROM for normal function with self care, reaching, carrying, lifting, house/yardwork, driving/computer work    Modalities:     [x] GAME READY (VASO)- for significant edema, swelling, pain control. Charges:  Timed Code Treatment Minutes: 40   Total Treatment Minutes:  50   BWC:  TE TIME:  NMR TIME:  MANUAL TIME:  UNTIMED MINUTES:  Medicare Total:         [] EVAL (LOW) 06294 (typically 20 minutes face-to-face)  [] EVAL (MOD) 16685 (typically 30 minutes face-to-face)  [] EVAL (HIGH) 88521 (typically 45 minutes face-to-face)  [] RE-EVAL     [x] TL(22210) x  2  [] IONTO  [x] NMR (10903) x  1   [] VASO  [] Manual (76496) x     [x] Other:HP  [] TA x      [] Mech Traction (33697)  [] ES(attended) (43976)      [] ES (un) (28881):    ASSESSMENT:  Tolerated tx well. GOALS:      Patient stated goal: posture/ stand     Therapist goals for Patient:   Short Term Goals: To be achieved in: 2 weeks  1. Independent in HEP and progression per patient tolerance, in order to prevent re-injury. [x] Progressing: [] Met: [] Not Met: [] Adjusted     2. Patient will have a decrease in pain to facilitate improvement in movement, function, and ADLs as indicated by Functional Deficits. [x] Progressing: [] Met: [] Not Met: [] Adjusted     Long Term Goals: To be achieved in: 6-8 weeks  1. Disability index score of 15% or less for the DASH to assist with reaching prior level of function. [x] Progressing: [] Met: [] Not Met: [] Adjusted     2. Patient will demonstrate increased AROM to WNL to allow for proper joint functioning as indicated by patients Functional Deficits. [x] Progressing: [] Met: [] Not Met: [] Adjusted     3.  Patient will demonstrate an increase in Strength to 5/5 to allow for proper functional mobility as indicated by patients Functional Deficits. [x] Progressing: [] Met: [] Not Met: [] Adjusted     4. Patient will return to [facility] for  functional activities without increased symptoms or restriction. [x] Progressing: [] Met: [] Not Met: [] Adjusted     5. Stand/ posture with min limitations (patient specific functional goal)    [x] Progressing: [] Met: [] Not Met: [] Adjusted            Overall Progression Towards Functional goals/ Treatment Progress Update:  [] Patient is progressing as expected towards functional goals listed. [] Progression is slowed due to complexities/Impairments listed. [] Progression has been slowed due to co-morbidities. [x] Plan just implemented, too soon to assess goals progression <30days   [] Goals require adjustment due to lack of progress  [] Patient is not progressing as expected and requires additional follow up with physician  [] Other    Prognosis for POC: [x] Good [] Fair  [] Poor      Patient requires continued skilled intervention: [x] Yes  [] No    Treatment/Activity Tolerance:  [x] Patient able to complete treatment  [] Patient limited by fatigue  [] Patient limited by pain    [] Patient limited by other medical complications  [] Other:       PLAN: See eval  [] Continue per plan of care [] Alter current plan (see comments above)  [x] Plan of care initiated [] Hold pending MD visit [] Discharge    Electronically signed by:  Hood Perea PTA , ATC  Note: If patient does not return for scheduled/ recommended follow up visits, this note will serve as a discharge from care along with most recent update on progress.

## 2021-12-23 ENCOUNTER — HOSPITAL ENCOUNTER (OUTPATIENT)
Dept: PHYSICAL THERAPY | Age: 51
Setting detail: THERAPIES SERIES
End: 2021-12-23
Payer: COMMERCIAL

## 2021-12-30 ENCOUNTER — OFFICE VISIT (OUTPATIENT)
Dept: ORTHOPEDIC SURGERY | Age: 51
End: 2021-12-30
Payer: COMMERCIAL

## 2021-12-30 VITALS — BODY MASS INDEX: 25.05 KG/M2 | WEIGHT: 175 LBS | HEIGHT: 70 IN

## 2021-12-30 DIAGNOSIS — M45.6 ANKYLOSING SPONDYLITIS LUMBAR REGION (HCC): ICD-10-CM

## 2021-12-30 DIAGNOSIS — Z96.643 S/P HIP REPLACEMENT, BILATERAL: ICD-10-CM

## 2021-12-30 DIAGNOSIS — M24.551 FLEXION CONTRACTURE OF HIP, RIGHT: ICD-10-CM

## 2021-12-30 DIAGNOSIS — M25.551 RIGHT HIP PAIN: Primary | ICD-10-CM

## 2021-12-30 PROCEDURE — 99204 OFFICE O/P NEW MOD 45 MIN: CPT | Performed by: ORTHOPAEDIC SURGERY

## 2021-12-30 RX ORDER — HYDROCODONE BITARTRATE AND ACETAMINOPHEN 5; 325 MG/1; MG/1
1 TABLET ORAL EVERY 6 HOURS PRN
COMMUNITY

## 2021-12-30 RX ORDER — BUPROPION HYDROCHLORIDE 100 MG/1
100 TABLET ORAL 2 TIMES DAILY
COMMUNITY

## 2021-12-30 RX ORDER — PREGABALIN 50 MG/1
50 CAPSULE ORAL 3 TIMES DAILY
COMMUNITY

## 2021-12-30 NOTE — PROGRESS NOTES
Date:  2021    Name:  Howard Hinton  Address:  [place] [address]    :  1970      Age:   45 y.o.    SSN:  [id number]      Medical Record Number:  <[id number]>    Reason for Visit:    Chief Complaint    Hip Pain (NP RIGHT HIP PAIN)      DOS:2021     HPI: Roberta Arciniega is a 45 y.o. male here today for right anterior hip stiffness for the past few months. He had bilateral hip replacements in [place] in 2016. He currently lives in [place] and works in [employer]. He had bene seeing PT at our [facility] office, due to the sense that he cannot extend his right hip to the same extent as his left. No recent falls or setbacks. He has a history of ulcerative colitis and ankylosing spondylitis, treated with methotrexate. No prior back surgeries. No numbness or tingling at this time. Pain Assessment  Location of Pain:  (HIP)  Location Modifiers: Right  Severity of Pain: 7  Quality of Pain: Dull,Aching,Throbbing (MUSCLE CRAMPS/FATIGUE)  Duration of Pain: Persistent  Frequency of Pain: Intermittent  Aggravating Factors: Standing,Walking  Limiting Behavior: Yes  Relieving Factors: Rest  Result of Injury: No  Work-Related Injury: No  Are there other pain locations you wish to document?: No  ROS: Review of systems reviewed from Patient History Form completed today and available in the patient's chart under the Media tab. Past Medical History:   Diagnosis Date    History of bilateral hip replacements     2016        No past surgical history on file. No family history on file.     Social History     Socioeconomic History    Marital status:      Spouse name: None    Number of children: None    Years of education: None    Highest education level: None   Occupational History    None   Tobacco Use    Smoking status: Light Tobacco Smoker     Packs/day: 0.25     Types: Cigarettes    Smokeless tobacco: Never Used   Substance and Sexual Activity    Alcohol use: None    Drug use: None    Sexual activity: None   Other Topics Concern    None   Social History Narrative    None     Social Determinants of Health     Financial Resource Strain:     Difficulty of Paying Living Expenses: Not on file   Food Insecurity:     Worried About Running Out of Food in the Last Year: Not on file    Alia of Food in the Last Year: Not on file   Transportation Needs:     Lack of Transportation (Medical): Not on file    Lack of Transportation (Non-Medical): Not on file   Physical Activity:     Days of Exercise per Week: Not on file    Minutes of Exercise per Session: Not on file   Stress:     Feeling of Stress : Not on file   Social Connections:     Frequency of Communication with Friends and Family: Not on file    Frequency of Social Gatherings with Friends and Family: Not on file    Attends [employer] Services: Not on file    Active Member of [address] [place] [address] or Organizations: Not on file    Attends Club or Organization Meetings: Not on file    Marital Status: Not on file   Intimate Partner Violence:     Fear of Current or Ex-Partner: Not on file    Emotionally Abused: Not on file    Physically Abused: Not on file    Sexually Abused: Not on file   Housing Stability:     Unable to Pay for Housing in the Last Year: Not on file    Number of [place] in the Last Year: Not on file    Unstable Housing in the Last Year: Not on file       Current Outpatient Medications   Medication Sig Dispense Refill    adalimumab (HUMIRA) 40 MG/0.8ML injection Inject 40 mg into the skin once      sertraline (ZOLOFT) 50 MG tablet Take 50 mg by mouth daily      buPROPion (WELLBUTRIN) 100 MG tablet Take 100 mg by mouth 2 times daily      pregabalin (LYRICA) 50 MG capsule Take 50 mg by mouth 3 times daily.  HYDROcodone-acetaminophen (NORCO) 5-325 MG per tablet Take 1 tablet by mouth every 6 hours as needed for Pain. No current facility-administered medications for this visit.        No Known Allergies    Vital signs:  Ht 5' 10\" (1.778 m)   Wt 175 lb (79.4 kg)   BMI 25.11 kg/m²        Constitutional: The physical examination finds the patient to be well-developed and well-nourished. The patient is alert and oriented x3 and was cooperative throughout the visit. Neuro: no focal deficits noted. Normal mood, judgement, decision making  Eyes: sclera clear, EOMI  Ears: Normal external ear  Mouth:  No perioral lesions  Pulm: Respirations unlabored and regular  Pulse: Extremities well perfused, warm, capillary refill < 2 seconds  Musculoskeletal:    Hip Examination: RIGHT/LEFT    Skin/Inspection: no skin lesions, cellulitis, or extreme edema in the lower extremities. Standing/Walking: mildly antalgic gait, no pelvic tilt, mild Trendelenburg sign. No use of assistive devices    Sitting Exam: 5/5 Hip Flexor Strength, 5/5 Abductor Strength, 5/5 Adductor strength, Negative Straight Leg Raise    Supine Exam: Non tender around the ASIS, AIIS. Tender and tight along the TFL/rectus femoris  Flexion arc 0 to 90deg, with no  pain  Internal Rotation 10deg   External Rotation 20deg  Special Tests: -veDeep Flexion Test, -veFADIR , tight+ LOW, negative resisted sit-up (Athletic Pubalgia). Side Lying Exam: non tender at greater trochanter, non tender at abductor musculature, + tight and tender along the TFL, non  tender along short external rotators/piriformis. Abductor side leg raise 5/5 strength. Mildly  + OberTest    Prone Exam: + hip flexion contracture coming from lumbar kyphosis that is fixed. , internal rotation to 15 deg, external rotation to 30 deg. Non tender at the ischial tuberosity/proximal hamstring    Distal Neurovascular exam is intact (foot sensation, pulses, and motor exam)      Diagnostics:  Radiology:       Pertinent imaging reviewed, images only - no report available.     Radiographs were obtained and reviewed in the office; 3 views: AP Pelvis and right and left  hip 45-deg Talavera View, and right and elft False Profile View    Impression: obligatory pelvic outlet view due to obvious ankylosis spondylitis of the spine/SI joint region. Bilateral total hip replacement with no obvious component malposition, or loosening. No acute findings. Assessment: Patient is a 45 y.o. male who is 5 years post bilateral hip replacements at an outside institution. He is noting subjective hip flexion contracture consistent with an objective hip flexion contracture on physical exam on the right side worse than left. This is driven by a fixed lumbar kyphosis from ankylosis spondylitis, and a posterior pelvic tilt causing fixed pelvic anteversion therefore resulting in tightness to the anterior chain musculature, specifically the rectus femoris, tensor fascia anitra, iliopsoas. Impression:  Visit Diagnoses       Codes    Right hip pain    -  Primary M25.551    Flexion contracture of hip, right     M24.551    Ankylosing spondylitis lumbar region [facility])     M45.6    S/P hip replacement, bilateral     Z96.643          Office Procedures:  No orders of the defined types were placed in this encounter. Orders Placed This Encounter   Procedures    XR HIP RIGHT (2-3 VIEWS)     Standing Status:   Future     Number of Occurrences:   1     Standing Expiration Date:   12/30/2022    Ambulatory referral to Physical Therapy     Referral Priority:   Routine     Referral Type:   Eval and Treat     Referral Reason:   Specialty Services Required     Requested Specialty:   Physical Therapy     Number of Visits Requested:   1       Plan:  Pertinent imaging was reviewed. The etiology, natural history, and treatment options for the disorder were discussed. The roles of activity medication, antiinflammatories, injections, bracing, physical therapy, and surgical interventions were all described to the patient and questions were answered.     We believe patient is a candidate for continued formal physical therapy now with emphasis on advanced

## 2022-01-04 ENCOUNTER — HOSPITAL ENCOUNTER (OUTPATIENT)
Dept: PHYSICAL THERAPY | Age: 52
Setting detail: THERAPIES SERIES
Discharge: HOME | End: 2022-01-04
Payer: COMMERCIAL

## 2022-01-04 PROCEDURE — 97112 NEUROMUSCULAR REEDUCATION: CPT | Performed by: SPECIALIST/TECHNOLOGIST

## 2022-01-04 PROCEDURE — 97110 THERAPEUTIC EXERCISES: CPT | Performed by: SPECIALIST/TECHNOLOGIST

## 2022-01-04 NOTE — FLOWSHEET NOTE
[facility]23 [facility], [address] [place] [address], [address] [place] [address] [address]  Phone: [phone]   Fax:     [phone]      Physical Therapy Treatment Note/ Progress Report:     Date:  2022    Patient Name:  Aileen James    :  1970  MRN: [id number]  Restrictions/Precautions:    Medical/Treatment Diagnosis Information:  ·  M54.6  Thoracic pain  ·  M54.5  Lumbar pain  · M25.512 L Shoulder pain  Insurance/Certification information:    R  Physician Information:    Clemencia Alamo MD  Has the plan of care been signed (Y/N):        []  Yes  [x]  No     Date of Patient follow up with Physician: NS    Is this a Progress Report:     []  Yes  [x]  No     If Yes:  Date Range for reporting period:  Beginnin21 ------------ Endin21    Progress report will be due (10 Rx or 30 days whichever is less):      Recertification will be due (POC Duration  / 90 days whichever is less): 21      Visit # Insurance Allowable Auth Required   In Person 5  []  Yes     []  No    Tele Health 0  []  Yes     []  No    Total 5       Functional Scale: Oswestry 30%   Date assessed:  21      Latex Allergy:  [x]NO      []YES  Preferred Language for Healthcare:   [x]English       []other:    Pain level:  3-4/10     SUBJECTIVE:  Pt notes that he saw the MD for his hip. Notes MD wants him to work on his hip flexor.      OBJECTIVE:    Observation:    Test measurements:      RESTRICTIONS/PRECAUTIONS: colonoscomy bag  B THR    Exercises/Interventions:   Therapeutic Ex (86413) Sets/sec Reps Notes/CUES HEP    x    x    x   Prone quad stretch 30\" x 3      Chin tucks   20x  x   Prone prop on towel for Hip Flexor strectch 3'          LTR 10\" x10     Posterior pelvic tilt  10\" x10     Posterior tilt with alt steps  x10      Posterior tilt with arms oh x10      Fig 4 stretch 10\"  x10 ea     Manual Intervention (84015)       Prone leg pull  5 min NMR re-education (79439)   CUES NEEDED    PB rows BL 20x  x                 bridges  15x  x   Supine PB clamshells BL 20x  x          Wall squats  15x  x                 Therapeutic Activity (18385)                     HP T/L  10 min                   [place] access code: [email or web address]           Therapeutic Exercise and NMR EXR  [x] (35780) Provided verbal/tactile cueing for activities related to strengthening, flexibility, endurance, ROM  for improvements in scapular, scapulothoracic and UE control with self care, reaching, carrying, lifting, house/yardwork, driving/computer work.    [] (90714) Provided verbal/tactile cueing for activities related to improving balance, coordination, kinesthetic sense, posture, motor skill, proprioception  to assist with  scapular, scapulothoracic and UE control with self care, reaching, carrying, lifting, house/yardwork, driving/computer work. Therapeutic Activities:    [x] (83894 or 14911) Provided verbal/tactile cueing for activities related to improving balance, coordination, kinesthetic sense, posture, motor skill, proprioception and motor activation to allow for proper function of scapular, scapulothoracic and UE control with self care, carrying, lifting, driving/computer work.      Home Exercise Program:    [x] (42858) Reviewed/Progressed HEP activities related to strengthening, flexibility, endurance, ROM of scapular, scapulothoracic and UE control with self care, reaching, carrying, lifting, house/yardwork, driving/computer work  [] (41288) Reviewed/Progressed HEP activities related to improving balance, coordination, kinesthetic sense, posture, motor skill, proprioception of scapular, scapulothoracic and UE control with self care, reaching, carrying, lifting, house/yardwork, driving/computer work      Manual Treatments:  PROM / STM / Oscillations-Mobs:  G-I, II, III, IV (PA's, Inf., Post.)  [x] (91172) Provided manual therapy to mobilize soft tissue/joints of cervical/CT, scapular GHJ and UE for the purpose of modulating pain, promoting relaxation,  increasing ROM, reducing/eliminating soft tissue swelling/inflammation/restriction, improving soft tissue extensibility and allowing for proper ROM for normal function with self care, reaching, carrying, lifting, house/yardwork, driving/computer work    Modalities:     [x] GAME READY (VASO)- for significant edema, swelling, pain control. Charges:  Timed Code Treatment Minutes: 40   Total Treatment Minutes:  50   BWC:  TE TIME:  NMR TIME:  MANUAL TIME:  UNTIMED MINUTES:  Medicare Total:         [] EVAL (LOW) 44239 (typically 20 minutes face-to-face)  [] EVAL (MOD) 41956 (typically 30 minutes face-to-face)  [] EVAL (HIGH) 70692 (typically 45 minutes face-to-face)  [] RE-EVAL     [x] KX(18212) x  2  [] IONTO  [x] NMR (35447) x  1   [] VASO  [] Manual (39353) x     [x] Other:HP  [] TA x      [] Mech Traction (58507)  [] ES(attended) (23618)      [] ES (un) (84565):    ASSESSMENT:  Tolerated tx well. GOALS:      Patient stated goal: posture/ stand     Therapist goals for Patient:   Short Term Goals: To be achieved in: 2 weeks  1. Independent in HEP and progression per patient tolerance, in order to prevent re-injury. [x] Progressing: [] Met: [] Not Met: [] Adjusted     2. Patient will have a decrease in pain to facilitate improvement in movement, function, and ADLs as indicated by Functional Deficits. [x] Progressing: [] Met: [] Not Met: [] Adjusted     Long Term Goals: To be achieved in: 6-8 weeks  1. Disability index score of 15% or less for the DASH to assist with reaching prior level of function. [x] Progressing: [] Met: [] Not Met: [] Adjusted     2. Patient will demonstrate increased AROM to WNL to allow for proper joint functioning as indicated by patients Functional Deficits. [x] Progressing: [] Met: [] Not Met: [] Adjusted     3.  Patient will demonstrate an increase in Strength to 5/5 to allow for proper functional mobility as indicated by patients Functional Deficits. [x] Progressing: [] Met: [] Not Met: [] Adjusted     4. Patient will return to [facility] for  functional activities without increased symptoms or restriction. [x] Progressing: [] Met: [] Not Met: [] Adjusted     5. Stand/ posture with min limitations (patient specific functional goal)    [x] Progressing: [] Met: [] Not Met: [] Adjusted            Overall Progression Towards Functional goals/ Treatment Progress Update:  [] Patient is progressing as expected towards functional goals listed. [] Progression is slowed due to complexities/Impairments listed. [] Progression has been slowed due to co-morbidities. [x] Plan just implemented, too soon to assess goals progression <30days   [] Goals require adjustment due to lack of progress  [] Patient is not progressing as expected and requires additional follow up with physician  [] Other    Prognosis for POC: [x] Good [] Fair  [] Poor      Patient requires continued skilled intervention: [x] Yes  [] No    Treatment/Activity Tolerance:  [x] Patient able to complete treatment  [] Patient limited by fatigue  [] Patient limited by pain    [] Patient limited by other medical complications  [] Other:       PLAN: See eval  [] Continue per plan of care [] Alter current plan (see comments above)  [x] Plan of care initiated [] Hold pending MD visit [] Discharge    Electronically signed by:  Emely Kearney PTA , ATC  Note: If patient does not return for scheduled/ recommended follow up visits, this note will serve as a discharge from care along with most recent update on progress.

## 2022-01-07 ENCOUNTER — HOSPITAL ENCOUNTER (OUTPATIENT)
Dept: PHYSICAL THERAPY | Age: 52
Setting detail: THERAPIES SERIES
Discharge: HOME | End: 2022-01-07
Payer: COMMERCIAL

## 2022-01-07 PROCEDURE — 97112 NEUROMUSCULAR REEDUCATION: CPT | Performed by: SPECIALIST/TECHNOLOGIST

## 2022-01-07 PROCEDURE — 97110 THERAPEUTIC EXERCISES: CPT | Performed by: SPECIALIST/TECHNOLOGIST

## 2022-01-07 PROCEDURE — 20560 NDL INSJ W/O NJX 1 OR 2 MUSC: CPT

## 2022-01-07 NOTE — PROGRESS NOTES
[address]23 [facility], [address] [place] [address], [address] [place] [address] [address]  Phone: [phone]   Fax: [phone]    Date: 2022          Patient Name; :  Gabe Willett; 1970   Dx: Diagnosis: M25.551 (ICD-10-CM) - Right hip pain      Physician: Referring Practitioner: Monika Barrera MD        Total PT Visits: 1      Measures Previous Current   Pain (0-10)     Disability %       Hip extension -7°    Assessment: Nerissa Walker is being seen in PT for right hip pain and loss of ROM, He does have history of ankylosing spondylitis and bilateral hip replacements. Notable loss of hip extension on right with tenderness at TFL and rectus femoris. Will trial dry needling in combination with stretching and flexibility program.        Prognosis for POC: [x] Good [] Fair  [] Poor      Patient requires continued skilled intervention: [x] Yes  [] No        Plan & Recommendations:  [x] Continue rehabilitation due to objective improvement and continued functional deficits with frequency and duration: 1-2 x weeks 6 weeks  [] Progress toward  []GAP, []Work Conditioning, []Independent HEP   [] Discharge due to   [] All goals achieved, [] Maximized \"medical necessity\" [] No subjective or objective improvements      Electronically signed by:  Lexie Toro PT  Therapy Plan of Care Re-Certification  This patient has been re-evaluated for physical therapy services and for therapy to continue, Medicare, Medicaid and other insurances require periodic physician review of the treatment plan.  Please review the above re-evaluation and verify that you agree with plan of care as established above by signing the attached document and return it to our office or note changes to established plan below  [] Follow treatment plan as above [] Discontinue physical therapy  [] Change plan to:                                 __________________________________________________    Physician Signature:____________________________________ Date:____________  By signing above, therapists plan is approved by physician    If you have any questions or concerns, please don't hesitate to call.   Thank you for your referral.

## 2022-01-07 NOTE — FLOWSHEET NOTE
SEATED                                                 STANDING                                                                                                                  Manual (10249): Dry needling manual therapy: consisted on the placement of 5 needles in the following muscles:  TFL, rectus femoris. A 60 mm needle was inserted, piston, rotated, and coned to produce intramuscular mobilization. These techniques were used to restore functional range of motion, reduce muscle spasm and induce healing in the corresponding musculature. (10742)  Clean Technique was utilized today while applying Dry needling treatment. The treatment sites where cleaned with 70% solution of  isopropyl alcohol . The PT washed their hands and utilized treatment gloves along with hand  prior to inserting the needles. All needles where removed and discarded in the appropriate sharps container. Therapeutic Exercise and NMR EXR  [x] (38466) Provided verbal/tactile cueing for activities related to strengthening, flexibility, endurance, ROM for improvements in LE, proximal hip, and core control with self care, mobility, lifting, ambulation. [x] (29924) Provided verbal/tactile cueing for activities related to improving balance, coordination, kinesthetic sense, posture, motor skill, proprioception to assist with LE, proximal hip, and core control in self-care, mobility, lifting, ambulation and eccentric single leg control.      NMR and Therapeutic Activities:    [x] (41889 or 91701) Provided verbal/tactile cueing for activities related to improving balance, coordination, kinesthetic sense, posture, motor skill, proprioception and motor activation to allow for proper function of core, proximal hip and LE with self-care and ADLs and functional mobility.   [] (97583) Gait Re-education- Provided training and instruction to the patient for proper LE, core and proximal hip recruitment and positioning and eccentric body weight control with ambulation re-education including up and down stairs     Home Exercise Program:    [x] (41314) Reviewed/Progressed HEP activities related to strengthening, flexibility, endurance, ROM of core, proximal hip and LE for functional self-care, mobility, lifting and ambulation/stair navigation   [] (31934) Reviewed/Progressed HEP activities related to improving balance, coordination, kinesthetic sense, posture, motor skill, proprioception of core, proximal hip and LE for self-care, mobility, lifting, and ambulation/stair navigation      Manual Treatments:  PROM / STM / Oscillations-Mobs:  G-I, II, III, IV (PA's, Inf., Post.)  [x] (21164) Provided manual therapy to mobilize LE, proximal hip and/or LS spine soft tissue/joints for the purpose of modulating pain, promoting relaxation, increasing ROM, reducing/eliminating soft tissue swelling/inflammation/restriction, improving soft tissue extensibility and allowing for proper ROM for normal function with self-care, mobility, lifting and ambulation. Modalities:     [] GAME READY (VASO)- for significant edema, swelling, pain control. Charges:  Timed Code Treatment Minutes: 20   Total Treatment Minutes:  20   BWC:  TE TIME:  NMR TIME:  MANUAL TIME:   TA  UNTIMED MINUTES:  Medicare Total:           20        [] EVAL (LOW) 84839 (typically 20 minutes face-to-face)  [] EVAL (MOD) 76393 (typically 30 minutes face-to-face)  [] EVAL (HIGH) 09281 (typically 45 minutes face-to-face)  [] RE-EVAL     [] LS(31561) x     [] IONTO  [] NMR (93796) x     [] VASO  [] Manual (24658) x     [x] Dry Needle:  [] TA x      [] Mech Traction (77382)  [] ES(attended) (89808)      [] ES (un) (22530):        GOALS:    Pt will reach 0° hip extension            ASSESSMENT:  See eval    Patient received education on their current pathology and how their condition effects them with their functional activities. Patient understood discussion and questions were answered.  Patient understands their activity limitations and understands rational for treatment progression. Pt educated on plan of care and HEP, if worsening symptoms to d/c that exercise. Return to Play: (if applicable)   []  Stage 1: Intro to Strength   []  Stage 2: Return to Run and Strength   []  Stage 3: Return to Jump and Strength   []  Stage 4: Dynamic Strength and Agility   []  Stage 5: Sport Specific Training     []  Ready to Return to Play, Meets All Above Stages   []  Not Ready for Return to Sports   Comments:                               PLAN: See eval  [x] Continue per plan of care [] Alter current plan (see comments above)  [] Plan of care initiated [] Hold pending MD visit [] Discharge      Electronically signed by:  Candido Molina, PT    Note: If patient does not return for scheduled/ recommended follow up visits, this note will serve as a discharge from care along with most recent update on progress.

## 2022-01-07 NOTE — FLOWSHEET NOTE
[address] [place] [address] and Sports Rehabilitation[address], [address] [place] [address] [address]  Phone: [phone]   Fax:     [phone]      Physical Therapy Treatment Note/ Progress Report:     Date:  2022    Patient Name:  Brittany Howard    :  1970  MRN: [id number]  Restrictions/Precautions:    Medical/Treatment Diagnosis Information:  ·  M54.6  Thoracic pain  ·  M54.5  Lumbar pain  · M25.512 L Shoulder pain  Insurance/Certification information:    R  Physician Information:    Zora Aguila MD  Has the plan of care been signed (Y/N):        []  Yes  [x]  No     Date of Patient follow up with Physician: NS    Is this a Progress Report:     []  Yes  [x]  No     If Yes:  Date Range for reporting period:  Beginnin21 ------------ Endin21    Progress report will be due (10 Rx or 30 days whichever is less): 60     Recertification will be due (POC Duration  / 90 days whichever is less): 21      Visit # Insurance Allowable Auth Required   In Person 7  []  Yes     []  No    Tele Health 0  []  Yes     []  No    Total 7       Functional Scale: Oswestry 30%   Date assessed:  21  Next visit  Latex Allergy:  [x]NO      []YES  Preferred Language for Healthcare:   [x]English       []other:    Pain level:  3-4/10     SUBJECTIVE:  Pt notes that he he is encouraged by how he is feeling better    OBJECTIVE:    Observation:    Test measurements:      RESTRICTIONS/PRECAUTIONS: colonoscomy bag  B THR    Exercises/Interventions:   Therapeutic Ex (06034) Sets/sec Reps Notes/CUES HEP    x    x    x   Prone quad stretch 30\" x 3      Chin tucks   20x  x   Prone prop on towel for Hip Flexor strectch 3'          LTR 10\" x10     Posterior pelvic tilt  10\" x10     Posterior tilt with alt steps  x10      Posterior tilt with arms oh x10      Fig 4 stretch 10\"  x10 ea     Manual Intervention (96608)       Prone leg pull  5 min     Kneeling hip flexor stretch  10\" 10                                 NMR re-education (78741)   CUES NEEDED    PB rows BL 20x  x                 bridges  15x  x   Supine PB clamshells BL 20x  x          Wall squats  15x  x                 Therapeutic Activity (24774)                     HP T/L  10 min                   [employer] access code: [email or web address]           Therapeutic Exercise and NMR EXR  [x] (04237) Provided verbal/tactile cueing for activities related to strengthening, flexibility, endurance, ROM  for improvements in scapular, scapulothoracic and UE control with self care, reaching, carrying, lifting, house/yardwork, driving/computer work.    [] (25842) Provided verbal/tactile cueing for activities related to improving balance, coordination, kinesthetic sense, posture, motor skill, proprioception  to assist with  scapular, scapulothoracic and UE control with self care, reaching, carrying, lifting, house/yardwork, driving/computer work. Therapeutic Activities:    [x] (60905 or 37689) Provided verbal/tactile cueing for activities related to improving balance, coordination, kinesthetic sense, posture, motor skill, proprioception and motor activation to allow for proper function of scapular, scapulothoracic and UE control with self care, carrying, lifting, driving/computer work.      Home Exercise Program:    [x] (18794) Reviewed/Progressed HEP activities related to strengthening, flexibility, endurance, ROM of scapular, scapulothoracic and UE control with self care, reaching, carrying, lifting, house/yardwork, driving/computer work  [] (97650) Reviewed/Progressed HEP activities related to improving balance, coordination, kinesthetic sense, posture, motor skill, proprioception of scapular, scapulothoracic and UE control with self care, reaching, carrying, lifting, house/yardwork, driving/computer work      Manual Treatments:  PROM / STM / Oscillations-Mobs:  G-I, II, III, IV (PA's, Inf., Post.)  [x] (55221) Provided manual therapy to mobilize soft tissue/joints of cervical/CT, scapular GHJ and UE for the purpose of modulating pain, promoting relaxation,  increasing ROM, reducing/eliminating soft tissue swelling/inflammation/restriction, improving soft tissue extensibility and allowing for proper ROM for normal function with self care, reaching, carrying, lifting, house/yardwork, driving/computer work    Modalities:     [x] GAME READY (VASO)- for significant edema, swelling, pain control. Charges:  Timed Code Treatment Minutes: 40   Total Treatment Minutes:  50   BWC:  TE TIME:  NMR TIME:  MANUAL TIME:  UNTIMED MINUTES:  Medicare Total:         [] EVAL (LOW) 64593 (typically 20 minutes face-to-face)  [] EVAL (MOD) 20931 (typically 30 minutes face-to-face)  [] EVAL (HIGH) 39365 (typically 45 minutes face-to-face)  [] RE-EVAL     [x] OZ(89951) x  2  [] IONTO  [x] NMR (68985) x  1   [] VASO  [] Manual (63501) x     [x] Other:HP  [] TA x      [] Mech Traction (08791)  [] ES(attended) (61072)      [] ES (un) (58762):    ASSESSMENT:  Tolerated tx well. GOALS:      Patient stated goal: posture/ stand     Therapist goals for Patient:   Short Term Goals: To be achieved in: 2 weeks  1. Independent in HEP and progression per patient tolerance, in order to prevent re-injury. [x] Progressing: [] Met: [] Not Met: [] Adjusted     2. Patient will have a decrease in pain to facilitate improvement in movement, function, and ADLs as indicated by Functional Deficits. [x] Progressing: [] Met: [] Not Met: [] Adjusted     Long Term Goals: To be achieved in: 6-8 weeks  1. Disability index score of 15% or less for the DASH to assist with reaching prior level of function. [x] Progressing: [] Met: [] Not Met: [] Adjusted     2. Patient will demonstrate increased AROM to WNL to allow for proper joint functioning as indicated by patients Functional Deficits. [x] Progressing: [] Met: [] Not Met: [] Adjusted     3.  Patient will demonstrate an increase in Strength to 5/5 to allow for proper functional mobility as indicated by patients Functional Deficits. [x] Progressing: [] Met: [] Not Met: [] Adjusted     4. Patient will return to [facility] for  functional activities without increased symptoms or restriction. [x] Progressing: [] Met: [] Not Met: [] Adjusted     5. Stand/ posture with min limitations (patient specific functional goal)    [x] Progressing: [] Met: [] Not Met: [] Adjusted            Overall Progression Towards Functional goals/ Treatment Progress Update:  [] Patient is progressing as expected towards functional goals listed. [] Progression is slowed due to complexities/Impairments listed. [] Progression has been slowed due to co-morbidities. [x] Plan just implemented, too soon to assess goals progression <30days   [] Goals require adjustment due to lack of progress  [] Patient is not progressing as expected and requires additional follow up with physician  [] Other    Prognosis for POC: [x] Good [] Fair  [] Poor      Patient requires continued skilled intervention: [x] Yes  [] No    Treatment/Activity Tolerance:  [x] Patient able to complete treatment  [] Patient limited by fatigue  [] Patient limited by pain    [] Patient limited by other medical complications  [] Other:       PLAN: See eval  [] Continue per plan of care [] Alter current plan (see comments above)  [x] Plan of care initiated [] Hold pending MD visit [] Discharge    Electronically signed by:  Marky Gonzáles PTA , ATC  Note: If patient does not return for scheduled/ recommended follow up visits, this note will serve as a discharge from care along with most recent update on progress.

## 2022-01-11 ENCOUNTER — HOSPITAL ENCOUNTER (OUTPATIENT)
Dept: PHYSICAL THERAPY | Age: 52
Setting detail: THERAPIES SERIES
Discharge: HOME | End: 2022-01-11
Payer: COMMERCIAL

## 2022-01-11 PROCEDURE — 97112 NEUROMUSCULAR REEDUCATION: CPT | Performed by: SPECIALIST

## 2022-01-11 PROCEDURE — 97110 THERAPEUTIC EXERCISES: CPT | Performed by: SPECIALIST

## 2022-01-11 PROCEDURE — 97140 MANUAL THERAPY 1/> REGIONS: CPT | Performed by: SPECIALIST

## 2022-01-11 NOTE — FLOWSHEET NOTE
[address] [facility], [address] [place] [address] [address]  Phone: [phone]   Fax:     [phone]      Physical Therapy Treatment Note/ Progress Report:     Date:  2022    Patient Name:  Ivet Wilson    :  1970  MRN: [id number]  Restrictions/Precautions:    Medical/Treatment Diagnosis Information:  ·  M54.6  Thoracic pain  ·  M54.5  Lumbar pain  · M25.512 L Shoulder pain  Insurance/Certification information:    R  Physician Information:    Teresa Santiago MD  Has the plan of care been signed (Y/N):        []  Yes  [x]  No     Date of Patient follow up with Physician: NS    Is this a Progress Report:     []  Yes  [x]  No     If Yes:  Date Range for reporting period:  Beginnin21 ------------ Endin21    Progress report will be due (10 Rx or 30 days whichever is less):      Recertification will be due (POC Duration  / 90 days whichever is less): 21      Visit # Insurance Allowable Auth Required   In Person 8  []  Yes     []  No    Tele Health 0  []  Yes     []  No    Total 8       Functional Scale: Oswestry 30%   Date assessed:  21  Next visit  Latex Allergy:  [x]NO      []YES  Preferred Language for Healthcare:   [x]English       []other:    Pain level:  3-4/10     SUBJECTIVE:  Pt notes that he he is encouraged by how he is feeling better    OBJECTIVE:    Observation:    Test measurements:      RESTRICTIONS/PRECAUTIONS: colonoscomy bag  B THR    Exercises/Interventions:   Therapeutic Ex (80759) Sets/sec Reps Notes/CUES HEP    x    x    x   Prone quad stretch 30\" x 3      Chin tucks   20x  x   Prone prop on towel for Hip Flexor strectch 3'          LTR 10\" x10                 Fig 4 stretch 10\"  x10 ea     Manual Intervention (28181)       Prone leg pull  5 min     Kneeling hip flexor stretch  10\" 10     T/ L PA mobs gentle  5 min                          NMR re-education (07125) CUES NEEDED    PB rows BL 20x  x   SL hip ext  15x ea     # 20x     bridges  15x  x   Supine PB clamshells BL 20x  x   JEFF B abd/ flex/ ext 15# 10x ea     Wall squats  15x  x   W/ Y prone  10x            Therapeutic Activity (94249)       Slant board  1 min     HR on slant board  30x     HP T/L  10 min                   Medbridge access code: [email or web address]           Therapeutic Exercise and NMR EXR  [x] (25084) Provided verbal/tactile cueing for activities related to strengthening, flexibility, endurance, ROM  for improvements in scapular, scapulothoracic and UE control with self care, reaching, carrying, lifting, house/yardwork, driving/computer work.    [] (43619) Provided verbal/tactile cueing for activities related to improving balance, coordination, kinesthetic sense, posture, motor skill, proprioception  to assist with  scapular, scapulothoracic and UE control with self care, reaching, carrying, lifting, house/yardwork, driving/computer work. Therapeutic Activities:    [x] (09100 or 09403) Provided verbal/tactile cueing for activities related to improving balance, coordination, kinesthetic sense, posture, motor skill, proprioception and motor activation to allow for proper function of scapular, scapulothoracic and UE control with self care, carrying, lifting, driving/computer work.      Home Exercise Program:    [x] (52001) Reviewed/Progressed HEP activities related to strengthening, flexibility, endurance, ROM of scapular, scapulothoracic and UE control with self care, reaching, carrying, lifting, house/yardwork, driving/computer work  [] (30487) Reviewed/Progressed HEP activities related to improving balance, coordination, kinesthetic sense, posture, motor skill, proprioception of scapular, scapulothoracic and UE control with self care, reaching, carrying, lifting, house/yardwork, driving/computer work      Manual Treatments:  PROM / STM / Oscillations-Mobs:  G-I, II, III, IV (PA's, Inf., Post.)  [x] (20467) Provided manual therapy to mobilize soft tissue/joints of cervical/CT, scapular GHJ and UE for the purpose of modulating pain, promoting relaxation,  increasing ROM, reducing/eliminating soft tissue swelling/inflammation/restriction, improving soft tissue extensibility and allowing for proper ROM for normal function with self care, reaching, carrying, lifting, house/yardwork, driving/computer work    Modalities:     [x] GAME READY (VASO)- for significant edema, swelling, pain control. Charges:  Timed Code Treatment Minutes: 40   Total Treatment Minutes:  50   BWC:  TE TIME:  NMR TIME:  MANUAL TIME:  UNTIMED MINUTES:  Medicare Total:         [] EVAL (LOW) 62638 (typically 20 minutes face-to-face)  [] EVAL (MOD) 43909 (typically 30 minutes face-to-face)  [] EVAL (HIGH) 87294 (typically 45 minutes face-to-face)  [] RE-EVAL     [x] GA(78059) x  1  [] IONTO  [x] NMR (28801) x  1   [] VASO  [x] Manual (57330) x     [x] Other:HP  [] TA x      [] Mech Traction (87869)  [] ES(attended) (56574)      [] ES (un) (08227):    ASSESSMENT:  Tolerated tx well. GOALS:      Patient stated goal: posture/ stand     Therapist goals for Patient:   Short Term Goals: To be achieved in: 2 weeks  1. Independent in HEP and progression per patient tolerance, in order to prevent re-injury. [x] Progressing: [] Met: [] Not Met: [] Adjusted     2. Patient will have a decrease in pain to facilitate improvement in movement, function, and ADLs as indicated by Functional Deficits. [x] Progressing: [] Met: [] Not Met: [] Adjusted     Long Term Goals: To be achieved in: 6-8 weeks  1. Disability index score of 15% or less for the DASH to assist with reaching prior level of function. [x] Progressing: [] Met: [] Not Met: [] Adjusted     2. Patient will demonstrate increased AROM to WNL to allow for proper joint functioning as indicated by patients Functional Deficits. [x] Progressing: [] Met: [] Not Met: [] Adjusted     3.  Patient will demonstrate an increase in Strength to 5/5 to allow for proper functional mobility as indicated by patients Functional Deficits. [x] Progressing: [] Met: [] Not Met: [] Adjusted     4. Patient will return to [facility] for  functional activities without increased symptoms or restriction. [x] Progressing: [] Met: [] Not Met: [] Adjusted     5. Stand/ posture with min limitations (patient specific functional goal)    [x] Progressing: [] Met: [] Not Met: [] Adjusted            Overall Progression Towards Functional goals/ Treatment Progress Update:  [] Patient is progressing as expected towards functional goals listed. [] Progression is slowed due to complexities/Impairments listed. [] Progression has been slowed due to co-morbidities. [x] Plan just implemented, too soon to assess goals progression <30days   [] Goals require adjustment due to lack of progress  [] Patient is not progressing as expected and requires additional follow up with physician  [] Other    Prognosis for POC: [x] Good [] Fair  [] Poor      Patient requires continued skilled intervention: [x] Yes  [] No    Treatment/Activity Tolerance:  [x] Patient able to complete treatment  [] Patient limited by fatigue  [] Patient limited by pain    [] Patient limited by other medical complications  [] Other:       PLAN: See eval  [] Continue per plan of care [] Alter current plan (see comments above)  [x] Plan of care initiated [] Hold pending MD visit [] Discharge    Electronically signed by:  Tayler Carrion, PT , ATC  Note: If patient does not return for scheduled/ recommended follow up visits, this note will serve as a discharge from care along with most recent update on progress.

## 2022-01-14 ENCOUNTER — HOSPITAL ENCOUNTER (OUTPATIENT)
Dept: PHYSICAL THERAPY | Age: 52
Setting detail: THERAPIES SERIES
Discharge: HOME | End: 2022-01-14
Payer: COMMERCIAL

## 2022-01-14 ENCOUNTER — APPOINTMENT (OUTPATIENT)
Dept: PHYSICAL THERAPY | Age: 52
End: 2022-01-14
Payer: COMMERCIAL

## 2022-01-14 PROCEDURE — 20561 NDL INSJ W/O NJX 3+ MUSC: CPT

## 2022-01-14 PROCEDURE — 97112 NEUROMUSCULAR REEDUCATION: CPT | Performed by: SPECIALIST/TECHNOLOGIST

## 2022-01-14 PROCEDURE — 97110 THERAPEUTIC EXERCISES: CPT | Performed by: SPECIALIST/TECHNOLOGIST

## 2022-01-14 NOTE — FLOWSHEET NOTE
[address] [facility], [address] [place] [address] [address]  Phone: [phone]   Fax:     [phone]      Physical Therapy Treatment Note/ Progress Report:           Date:  2022    Patient Name:  Jelena Mendoza    :  1970  MRN: [id number]  Restrictions/Precautions:    Medical/Treatment Diagnosis Information:  · Diagnosis: M25.551 (ICD-10-CM) - Right hip pain  · Treatment Diagnosis: Right hip pain, decreased ROM  Insurance/Certification information:  PT Insurance Information: UMR, No Aydee Boot,  Physician Information:  Referring Practitioner: John Alvarado MD  Has the plan of care been signed (Y/N):        []  Yes  []  No     Date of Patient follow up with Physician:     Assessment Summary: Brian Lehman is being seen in PT for right hip pain and loss of ROM, He does have history of ankylosing spondylitis and bilateral hip replacements. Notable loss of hip extension on right with tenderness at TFL and rectus femoris. Will trial dry needling in combination with stretching and flexibility program.        Is this a Progress Report:     []  Yes  [x]  No        If Yes:  Date Range for reporting period:  Beginning   22  Ending 22    Progress report will be due (10 Rx or 30 days whichever is less): 69       Recertification will be due (POC Duration  / 90 days whichever is less): 22          Visit # Insurance Allowable Auth Required   In [address] []  Yes     []  No    Tele Health   []  Yes     []  No    Total 2             Functional Scale: LEFS    Date assessed:        Latex Allergy:  [x]NO      []YES  Preferred Language for Healthcare:   [x]English       []other:      Pain level:  /10     SUBJECTIVE:  see eval    OBJECTIVE: see eval      RESTRICTIONS/PRECAUTIONS: Colostomy bag    Exercises/Interventions: HEP code:    Therapeutic Ex (75413) HEP      Warm-up                     TABLE SEATED                                                 STANDING                                                                                                                  Manual (50859): Dry needling manual therapy: consisted on the placement of 5 needles in the following muscles:  TFL, rectus femoris and vastus lateralis. A 60 mm needle was inserted, piston, rotated, and coned to produce intramuscular mobilization. Addition of e stim also. These techniques were used to restore functional range of motion, reduce muscle spasm and induce healing in the corresponding musculature. (48028)  Clean Technique was utilized today while applying Dry needling treatment. The treatment sites where cleaned with 70% solution of  isopropyl alcohol . The PT washed their hands and utilized treatment gloves along with hand  prior to inserting the needles. All needles where removed and discarded in the appropriate sharps container. Therapeutic Exercise and NMR EXR  [x] (38219) Provided verbal/tactile cueing for activities related to strengthening, flexibility, endurance, ROM for improvements in LE, proximal hip, and core control with self care, mobility, lifting, ambulation. [x] (51338) Provided verbal/tactile cueing for activities related to improving balance, coordination, kinesthetic sense, posture, motor skill, proprioception to assist with LE, proximal hip, and core control in self-care, mobility, lifting, ambulation and eccentric single leg control.      NMR and Therapeutic Activities:    [x] (78187 or 97480) Provided verbal/tactile cueing for activities related to improving balance, coordination, kinesthetic sense, posture, motor skill, proprioception and motor activation to allow for proper function of core, proximal hip and LE with self-care and ADLs and functional mobility.   [] (40121) Gait Re-education- Provided training and instruction to the patient for proper LE, core and proximal hip recruitment and positioning and eccentric body weight control with ambulation re-education including up and down stairs     Home Exercise Program:    [x] (35692) Reviewed/Progressed HEP activities related to strengthening, flexibility, endurance, ROM of core, proximal hip and LE for functional self-care, mobility, lifting and ambulation/stair navigation   [] (73471) Reviewed/Progressed HEP activities related to improving balance, coordination, kinesthetic sense, posture, motor skill, proprioception of core, proximal hip and LE for self-care, mobility, lifting, and ambulation/stair navigation      Manual Treatments:  PROM / STM / Oscillations-Mobs:  G-I, II, III, IV (PA's, Inf., Post.)  [x] (81454) Provided manual therapy to mobilize LE, proximal hip and/or LS spine soft tissue/joints for the purpose of modulating pain, promoting relaxation, increasing ROM, reducing/eliminating soft tissue swelling/inflammation/restriction, improving soft tissue extensibility and allowing for proper ROM for normal function with self-care, mobility, lifting and ambulation. Modalities:     [] GAME READY (VASO)- for significant edema, swelling, pain control. Charges:  Timed Code Treatment Minutes: 20   Total Treatment Minutes:  20   BWC:  TE TIME:  NMR TIME:  MANUAL TIME:   TA  UNTIMED MINUTES:  Medicare Total:           20        [] EVAL (LOW) 67468 (typically 20 minutes face-to-face)  [] EVAL (MOD) 78148 (typically 30 minutes face-to-face)  [] EVAL (HIGH) 49014 (typically 45 minutes face-to-face)  [] RE-EVAL     [] FB(15500) x     [] IONTO  [] NMR (86027) x     [] VASO  [] Manual (84400) x     [x] Dry Needle:  [] TA x      [] Mech Traction (72625)  [] ES(attended) (95743)      [] ES (un) (91177):        GOALS:    Pt will reach 0° hip extension            ASSESSMENT:  See eval    Patient received education on their current pathology and how their condition effects them with their functional activities.  Patient understood discussion and questions were answered. Patient understands their activity limitations and understands rational for treatment progression. Pt educated on plan of care and HEP, if worsening symptoms to d/c that exercise. Return to Play: (if applicable)   []  Stage 1: Intro to Strength   []  Stage 2: Return to Run and Strength   []  Stage 3: Return to Jump and Strength   []  Stage 4: Dynamic Strength and Agility   []  Stage 5: Sport Specific Training     []  Ready to Return to Play, Meets All Above Stages   []  Not Ready for Return to Sports   Comments:                               PLAN: See eval  [x] Continue per plan of care [] Alter current plan (see comments above)  [] Plan of care initiated [] Hold pending MD visit [] Discharge      Electronically signed by:  Aylin Garcia, PT    Note: If patient does not return for scheduled/ recommended follow up visits, this note will serve as a discharge from care along with most recent update on progress.

## 2022-01-14 NOTE — FLOWSHEET NOTE
[address] [place] [address] and [facility] Rehabilitation[address], [address] [place] [address] [address]  Phone: [phone]   Fax:     [phone]      Physical Therapy Treatment Note/ Progress Report:     Date:  2022    Patient Name:  Luisana Zamarripa    :  1970  MRN: [id number]  Restrictions/Precautions:    Medical/Treatment Diagnosis Information:  ·  M54.6  Thoracic pain  ·  M54.5  Lumbar pain  · M25.512 L Shoulder pain  Insurance/Certification information:    R  Physician Information:    Tristan Blanco MD  Has the plan of care been signed (Y/N):        []  Yes  [x]  No     Date of Patient follow up with Physician: NS    Is this a Progress Report:     []  Yes  [x]  No     If Yes:  Date Range for reporting period:  Beginnin21 ------------ Endin21    Progress report will be due (10 Rx or 30 days whichever is less):      Recertification will be due (POC Duration  / 90 days whichever is less): 21      Visit # Insurance Allowable Auth Required   In Person 9  []  Yes     []  No    Tele Health 0  []  Yes     []  No    Total 9       Functional Scale: Oswestry 30%   Date assessed:  21  Next visit  Latex Allergy:  [x]NO      []YES  Preferred Language for Healthcare:   [x]English       []other:    Pain level:  3-4/10     SUBJECTIVE:  Pt notes that he he is encouraged by how he is feeling better    OBJECTIVE:    Observation:    Test measurements:      RESTRICTIONS/PRECAUTIONS: colonoscomy bag  B THR    Exercises/Interventions:   Therapeutic Ex (64270) Sets/sec Reps Notes/CUES HEP    x    x    x   Prone quad stretch 30\" x 3      Chin tucks   20x  x   Prone prop on towel for Hip Flexor strectch 3'          LTR 10\" x10                 Fig 4 stretch 10\"  x10 ea     Manual Intervention (96352)       Prone leg pull  5 min     Kneeling hip flexor stretch  10\" 10     T/ L PA mobs gentle  5 min                          NMR re-education (66485) CUES NEEDED    PB rows BL 20x  x   SL hip ext  15x ea     # 20x     bridges  15x  x   Supine PB clamshells BL 20x  x   JEFF B abd/ flex/ ext 15# 20x ea     Wall squats  15x  x   W/ Y prone  10x            Therapeutic Activity (95851)       Slant board  1 min     HR on slant board  30x     HP T/L  10 min                   Medbridge access code: [email or web address]           Therapeutic Exercise and NMR EXR  [x] (06995) Provided verbal/tactile cueing for activities related to strengthening, flexibility, endurance, ROM  for improvements in scapular, scapulothoracic and UE control with self care, reaching, carrying, lifting, house/yardwork, driving/computer work.    [] (76777) Provided verbal/tactile cueing for activities related to improving balance, coordination, kinesthetic sense, posture, motor skill, proprioception  to assist with  scapular, scapulothoracic and UE control with self care, reaching, carrying, lifting, house/yardwork, driving/computer work. Therapeutic Activities:    [x] (16189 or 28646) Provided verbal/tactile cueing for activities related to improving balance, coordination, kinesthetic sense, posture, motor skill, proprioception and motor activation to allow for proper function of scapular, scapulothoracic and UE control with self care, carrying, lifting, driving/computer work.      Home Exercise Program:    [x] (57762) Reviewed/Progressed HEP activities related to strengthening, flexibility, endurance, ROM of scapular, scapulothoracic and UE control with self care, reaching, carrying, lifting, house/yardwork, driving/computer work  [] (03675) Reviewed/Progressed HEP activities related to improving balance, coordination, kinesthetic sense, posture, motor skill, proprioception of scapular, scapulothoracic and UE control with self care, reaching, carrying, lifting, house/yardwork, driving/computer work      Manual Treatments:  PROM / STM / Oscillations-Mobs:  G-I, II, III, IV (PA's, Inf., Post.)  [x] (52066) Provided manual therapy to mobilize soft tissue/joints of cervical/CT, scapular GHJ and UE for the purpose of modulating pain, promoting relaxation,  increasing ROM, reducing/eliminating soft tissue swelling/inflammation/restriction, improving soft tissue extensibility and allowing for proper ROM for normal function with self care, reaching, carrying, lifting, house/yardwork, driving/computer work    Modalities:     [x] GAME READY (VASO)- for significant edema, swelling, pain control. Charges:  Timed Code Treatment Minutes: 40   Total Treatment Minutes:  40   BWC:  TE TIME:  NMR TIME:  MANUAL TIME:  UNTIMED MINUTES:  Medicare Total:         [] EVAL (LOW) 16163 (typically 20 minutes face-to-face)  [] EVAL (MOD) 06542 (typically 30 minutes face-to-face)  [] EVAL (HIGH) 43904 (typically 45 minutes face-to-face)  [] RE-EVAL     [x] QF(17657) x  2  [] IONTO  [x] NMR (31073) x  1   [] VASO   [] Manual (18025) x     [] Other:HP  [] TA x      [] Mech Traction (17791)  [] ES(attended) (54864)      [] ES (un) (45509):    ASSESSMENT:  Tolerated tx well. GOALS:      Patient stated goal: posture/ stand     Therapist goals for Patient:   Short Term Goals: To be achieved in: 2 weeks  1. Independent in HEP and progression per patient tolerance, in order to prevent re-injury. [x] Progressing: [] Met: [] Not Met: [] Adjusted     2. Patient will have a decrease in pain to facilitate improvement in movement, function, and ADLs as indicated by Functional Deficits. [x] Progressing: [] Met: [] Not Met: [] Adjusted     Long Term Goals: To be achieved in: 6-8 weeks  1. Disability index score of 15% or less for the DASH to assist with reaching prior level of function. [x] Progressing: [] Met: [] Not Met: [] Adjusted     2. Patient will demonstrate increased AROM to WNL to allow for proper joint functioning as indicated by patients Functional Deficits. [x] Progressing: [] Met: [] Not Met: [] Adjusted     3.  Patient will demonstrate an increase in Strength to 5/5 to allow for proper functional mobility as indicated by patients Functional Deficits. [x] Progressing: [] Met: [] Not Met: [] Adjusted     4. Patient will return to [facility] for  functional activities without increased symptoms or restriction. [x] Progressing: [] Met: [] Not Met: [] Adjusted     5. Stand/ posture with min limitations (patient specific functional goal)    [x] Progressing: [] Met: [] Not Met: [] Adjusted            Overall Progression Towards Functional goals/ Treatment Progress Update:  [] Patient is progressing as expected towards functional goals listed. [] Progression is slowed due to complexities/Impairments listed. [] Progression has been slowed due to co-morbidities. [x] Plan just implemented, too soon to assess goals progression <30days   [] Goals require adjustment due to lack of progress  [] Patient is not progressing as expected and requires additional follow up with physician  [] Other    Prognosis for POC: [x] Good [] Fair  [] Poor      Patient requires continued skilled intervention: [x] Yes  [] No    Treatment/Activity Tolerance:  [x] Patient able to complete treatment  [] Patient limited by fatigue  [] Patient limited by pain    [] Patient limited by other medical complications  [] Other:       PLAN: See eval  [] Continue per plan of care [] Alter current plan (see comments above)  [x] Plan of care initiated [] Hold pending MD visit [] Discharge    Electronically signed by:  Stefania Martínez PTA , ATC  Note: If patient does not return for scheduled/ recommended follow up visits, this note will serve as a discharge from care along with most recent update on progress.

## 2022-01-18 ENCOUNTER — APPOINTMENT (OUTPATIENT)
Dept: PHYSICAL THERAPY | Age: 52
End: 2022-01-18
Payer: COMMERCIAL

## 2022-01-21 ENCOUNTER — HOSPITAL ENCOUNTER (OUTPATIENT)
Dept: PHYSICAL THERAPY | Age: 52
Setting detail: THERAPIES SERIES
Discharge: HOME | End: 2022-01-21
Payer: COMMERCIAL

## 2022-01-21 PROCEDURE — 20561 NDL INSJ W/O NJX 3+ MUSC: CPT

## 2022-01-21 PROCEDURE — 97110 THERAPEUTIC EXERCISES: CPT | Performed by: SPECIALIST/TECHNOLOGIST

## 2022-01-21 PROCEDURE — 97112 NEUROMUSCULAR REEDUCATION: CPT | Performed by: SPECIALIST/TECHNOLOGIST

## 2022-01-21 PROCEDURE — G0283 ELEC STIM OTHER THAN WOUND: HCPCS

## 2022-01-21 NOTE — FLOWSHEET NOTE
[address] [place] [address] and Sports Rehabilitation[address], [address] [place] [address] [address]  Phone: [phone]   Fax:     [phone]      Physical Therapy Treatment Note/ Progress Report:           Date:  2022    Patient Name:  Darren Sinha    :  1970  MRN: [id number]  Restrictions/Precautions:    Medical/Treatment Diagnosis Information:  · Diagnosis: M25.551 (ICD-10-CM) - Right hip pain  · Treatment Diagnosis: Right hip pain, decreased ROM  Insurance/Certification information:  PT Insurance Information: UMR, No Nallely Grew,  Physician Information:  Referring Practitioner: Jorje Pena MD  Has the plan of care been signed (Y/N):        []  Yes  []  No     Date of Patient follow up with Physician:     Assessment Summary: Maria Alejandra Morris is being seen in PT for right hip pain and loss of ROM, He does have history of ankylosing spondylitis and bilateral hip replacements. Notable loss of hip extension on right with tenderness at TFL and rectus femoris. Will trial dry needling in combination with stretching and flexibility program.        Is this a Progress Report:     []  Yes  [x]  No        If Yes:  Date Range for reporting period:  Beginning   22  Ending 22    Progress report will be due (10 Rx or 30 days whichever is less): 28       Recertification will be due (POC Duration  / 90 days whichever is less): 22          Visit # Insurance Allowable Auth Required   In [facility] []  Yes     []  No    Tele Health   []  Yes     []  No    Total 3             Functional Scale: LEFS    Date assessed:        Latex Allergy:  [x]NO      []YES  Preferred Language for Healthcare:   [x]English       []other:      Pain level:  /10     SUBJECTIVE:  Pt states he is loosening up, less pain up near the hip. OBJECTIVE: see eval      RESTRICTIONS/PRECAUTIONS: Colostomy bag    Exercises/Interventions: HEP code:    Therapeutic Ex (99233) HEP      Warm-up       Rec bike 5', Lv 5            TABLE                                                 SEATED                                                 STANDING                                                                                                                  Manual (94780): Dry needling manual therapy: consisted on the placement of 5 needles in the following muscles:  TFL, rectus femoris and vastus lateralis. A 60 mm needle was inserted, piston, rotated, and coned to produce intramuscular mobilization. Addition of e stim also. These techniques were used to restore functional range of motion, reduce muscle spasm and induce healing in the corresponding musculature. (20206)  Clean Technique was utilized today while applying Dry needling treatment. The treatment sites where cleaned with 70% solution of  isopropyl alcohol . The PT washed their hands and utilized treatment gloves along with hand  prior to inserting the needles. All needles where removed and discarded in the appropriate sharps container. Therapeutic Exercise and NMR EXR  [x] (70322) Provided verbal/tactile cueing for activities related to strengthening, flexibility, endurance, ROM for improvements in LE, proximal hip, and core control with self care, mobility, lifting, ambulation. [x] (41261) Provided verbal/tactile cueing for activities related to improving balance, coordination, kinesthetic sense, posture, motor skill, proprioception to assist with LE, proximal hip, and core control in self-care, mobility, lifting, ambulation and eccentric single leg control.      NMR and Therapeutic Activities:    [x] (08627 or 69438) Provided verbal/tactile cueing for activities related to improving balance, coordination, kinesthetic sense, posture, motor skill, proprioception and motor activation to allow for proper function of core, proximal hip and LE with self-care and ADLs and functional mobility.   [] (60131) Gait Re-education- Provided training and instruction to the patient for proper LE, core and proximal hip recruitment and positioning and eccentric body weight control with ambulation re-education including up and down stairs     Home Exercise Program:    [x] (70372) Reviewed/Progressed HEP activities related to strengthening, flexibility, endurance, ROM of core, proximal hip and LE for functional self-care, mobility, lifting and ambulation/stair navigation   [] (90382) Reviewed/Progressed HEP activities related to improving balance, coordination, kinesthetic sense, posture, motor skill, proprioception of core, proximal hip and LE for self-care, mobility, lifting, and ambulation/stair navigation      Manual Treatments:  PROM / STM / Oscillations-Mobs:  G-I, II, III, IV (PA's, Inf., Post.)  [x] (36156) Provided manual therapy to mobilize LE, proximal hip and/or LS spine soft tissue/joints for the purpose of modulating pain, promoting relaxation, increasing ROM, reducing/eliminating soft tissue swelling/inflammation/restriction, improving soft tissue extensibility and allowing for proper ROM for normal function with self-care, mobility, lifting and ambulation. Modalities:     [] GAME READY (VASO)- for significant edema, swelling, pain control.        Charges:  Timed Code Treatment Minutes: 30   Total Treatment Minutes:  30   BWC:  TE TIME:  NMR TIME:  MANUAL TIME:   TA  UNTIMED MINUTES:  Medicare Total:           20        [] EVAL (LOW) 71793 (typically 20 minutes face-to-face)  [] EVAL (MOD) 47689 (typically 30 minutes face-to-face)  [] EVAL (HIGH) 13242 (typically 45 minutes face-to-face)  [] RE-EVAL     [] VW(47750) x     [] IONTO  [] NMR (61002) x     [] VASO  [] Manual (13896) x     [x] Dry Needle:  [] TA x      [] Mech Traction (43772)  [] ES(attended) (83805)      [x] ES (un) (48304):        GOALS:    Pt will reach 0° hip extension            ASSESSMENT:  See eval    Patient received education on their current pathology and how their condition effects them with their functional activities. Patient understood discussion and questions were answered. Patient understands their activity limitations and understands rational for treatment progression. Pt educated on plan of care and HEP, if worsening symptoms to d/c that exercise. Return to Play: (if applicable)   []  Stage 1: Intro to Strength   []  Stage 2: Return to Run and Strength   []  Stage 3: Return to Jump and Strength   []  Stage 4: Dynamic Strength and Agility   []  Stage 5: Sport Specific Training     []  Ready to Return to Play, Meets All Above Stages   []  Not Ready for Return to Sports   Comments:                               PLAN: See eval  [x] Continue per plan of care [] Alter current plan (see comments above)  [] Plan of care initiated [] Hold pending MD visit [] Discharge      Electronically signed by:  Kartik Jacques PT    Note: If patient does not return for scheduled/ recommended follow up visits, this note will serve as a discharge from care along with most recent update on progress.

## 2022-01-21 NOTE — PROGRESS NOTES
[facility] [facility] and [facility] Rehabilitation, [address] [place] [address], [address]  Phone: [phone]   Fax:     [phone]    [address] [facility] and [address] [place] [address], [address] [place] [address], [address]  Phone: [phone]   Fax: [phone]    Date: 2022          Patient Name; :  Patricia Salazar; 1970   Dx:    M54.6  Thoracic pain   M54.5  Lumbar pain  M25.512 L Shoulder pain       Physician:     Alexey Nieto      Total PT Visits: 10     Measures Previous Current   Pain (0-10) 3-4/10 3/10   Disability % 30% 30%     Specific Functional Improvements & Impressions: Pt presented to therapy with multiple orthopedic issues. Overall he has demonstrated improvements. Posture, function and mechanics have dramatic improvements. Plan & Recommendations:  [x] Continue rehabilitation due to objective improvement and continued functional deficits with frequency and duration:  [] Progress toward  []GAP, []Work Conditioning, []Independent HEP   [] Discharge due to   [] All goals achieved, [] Maximized \"medical necessity\" [] No subjective or objective improvements      Electronically signed by:  Kadeem Beltran PTA, ATC  Therapy Plan of Care Re-Certification  This patient has been re-evaluated for physical therapy services and for therapy to continue, Medicare, Medicaid and other insurances require periodic physician review of the treatment plan.  Please review the above re-evaluation and verify that you agree with plan of care as established above by signing the attached document and return it to our office or note changes to established plan below  [] Follow treatment plan as above [] Discontinue physical therapy  [] Change plan to:                                 __________________________________________________    Physician Signature:____________________________________ Date:____________  By signing above, therapists plan is approved by physician    If you have any questions or concerns, please don't hesitate to call. Thank you for your referral.    Physical Therapy Treatment Note/ Progress Report:     Date:  2022    Patient Name:  Christophe Diaz    :  1970  MRN: [id number]  Restrictions/Precautions:    Medical/Treatment Diagnosis Information:   M54.6  Thoracic pain   M54.5  Lumbar pain  M25.512 L Shoulder pain  Insurance/Certification information:    R  Physician Information:    Kirti Clark MD  Has the plan of care been signed (Y/N):        []  Yes  [x]  No     Date of Patient follow up with Physician: NS    Is this a Progress Report:     []  Yes  [x]  No     If Yes:  Date Range for reporting period:  Beginnin21 ------------ Endin21    Progress report will be due (10 Rx or 30 days whichever is less):      Recertification will be due (POC Duration  / 90 days whichever is less): 21      Visit # Insurance Allowable Auth Required   In Person 10  []  Yes     []  No    Tele Health 0  []  Yes     []  No    Total 10       Functional Scale: Oswestry  30% Date assessed:    22  Latex Allergy:  [x]NO      []YES  Preferred Language for Healthcare:   [x]English       []other:    Pain level:  3-4/10     SUBJECTIVE:  Pt notes he continues to show improvement.      OBJECTIVE:   Observation:   Test measurements:      RESTRICTIONS/PRECAUTIONS: colonoscomy bag  B THR    Exercises/Interventions:   Therapeutic Ex (36643) Sets/sec Reps Notes/CUES HEP    x    x    x   Prone quad stretch 30\" x 3      Chin tucks   20x  x   Prone prop on towel for Hip Flexor strectch 3'          LTR 10\" x10                 Fig 4 stretch 10\"  x10 ea     Manual Intervention (94150)       Prone leg pull  5 min     Kneeling hip flexor stretch  10\" 10     T/ L PA mobs gentle  5 min                          NMR re-education (65352)   CUES NEEDED    PB rows BL 20x  x   SL hip ext  15x ea     LP 160# 20x     bridges  15x  x   Supine PB clamshells BL 20x  x   JEFF B abd/ flex/ ext 15# 20x ea     Wall squats  15x  x   W/ Y prone  10x            Therapeutic Activity (68565)       Slant board  1 min     HR on slant board  30x     HP T/L  10 min                   Medbridge access code: [email or web address]           Therapeutic Exercise and NMR EXR  [x] (52767) Provided verbal/tactile cueing for activities related to strengthening, flexibility, endurance, ROM  for improvements in scapular, scapulothoracic and UE control with self care, reaching, carrying, lifting, house/yardwork, driving/computer work.    [] (32556) Provided verbal/tactile cueing for activities related to improving balance, coordination, kinesthetic sense, posture, motor skill, proprioception  to assist with  scapular, scapulothoracic and UE control with self care, reaching, carrying, lifting, house/yardwork, driving/computer work. Therapeutic Activities:    [x] (50051 or 30698) Provided verbal/tactile cueing for activities related to improving balance, coordination, kinesthetic sense, posture, motor skill, proprioception and motor activation to allow for proper function of scapular, scapulothoracic and UE control with self care, carrying, lifting, driving/computer work.      Home Exercise Program:    [x] (72967) Reviewed/Progressed HEP activities related to strengthening, flexibility, endurance, ROM of scapular, scapulothoracic and UE control with self care, reaching, carrying, lifting, house/yardwork, driving/computer work  [] (47350) Reviewed/Progressed HEP activities related to improving balance, coordination, kinesthetic sense, posture, motor skill, proprioception of scapular, scapulothoracic and UE control with self care, reaching, carrying, lifting, house/yardwork, driving/computer work      Manual Treatments:  PROM / STM / Oscillations-Mobs:  G-I, II, III, IV (PA's, Inf., Post.)  [x] (44107) Provided manual therapy to mobilize soft tissue/joints of cervical/CT, scapular GHJ and UE for the purpose of modulating pain, promoting relaxation,  increasing ROM, reducing/eliminating soft tissue swelling/inflammation/restriction, improving soft tissue extensibility and allowing for proper ROM for normal function with self care, reaching, carrying, lifting, house/yardwork, driving/computer work    Modalities:     [x] GAME READY (VASO)- for significant edema, swelling, pain control. Charges:  Timed Code Treatment Minutes: 40   Total Treatment Minutes:  40   BWC:  TE TIME:  NMR TIME:  MANUAL TIME:  UNTIMED MINUTES:  Medicare Total:         [] EVAL (LOW) 92976 (typically 20 minutes face-to-face)  [] EVAL (MOD) 24562 (typically 30 minutes face-to-face)  [] EVAL (HIGH) 51847 (typically 45 minutes face-to-face)  [] RE-EVAL     [x] ZA(22326) x  2  [] IONTO  [x] NMR (22469) x  1   [] VASO   [] Manual (43158) x     [] Other:HP  [] TA x      [] Mech Traction (68317)  [] ES(attended) (18004)      [] ES (un) (08177):    ASSESSMENT:  Tolerated tx well. GOALS:      Patient stated goal: posture/ stand     Therapist goals for Patient:   Short Term Goals: To be achieved in: 2 weeks  1. Independent in HEP and progression per patient tolerance, in order to prevent re-injury. [x] Progressing: [] Met: [] Not Met: [] Adjusted     2. Patient will have a decrease in pain to facilitate improvement in movement, function, and ADLs as indicated by Functional Deficits. [x] Progressing: [] Met: [] Not Met: [] Adjusted     Long Term Goals: To be achieved in: 6-8 weeks  1. Disability index score of 15% or less for the DASH to assist with reaching prior level of function. [x] Progressing: [] Met: [] Not Met: [] Adjusted     2. Patient will demonstrate increased AROM to WNL to allow for proper joint functioning as indicated by patients Functional Deficits. [x] Progressing: [] Met: [] Not Met: [] Adjusted     3.  Patient will demonstrate an increase in Strength to 5/5 to allow for proper functional mobility as indicated by patients Functional Deficits. [x] Progressing: [] Met: [] Not Met: [] Adjusted     4. Patient will return to [facility] for  functional activities without increased symptoms or restriction. [x] Progressing: [] Met: [] Not Met: [] Adjusted     5. Stand/ posture with min limitations (patient specific functional goal)    [x] Progressing: [] Met: [] Not Met: [] Adjusted            Overall Progression Towards Functional goals/ Treatment Progress Update:  [x] Patient is progressing as expected towards functional goals listed. [] Progression is slowed due to complexities/Impairments listed. [] Progression has been slowed due to co-morbidities. [] Plan just implemented, too soon to assess goals progression <30days   [] Goals require adjustment due to lack of progress  [] Patient is not progressing as expected and requires additional follow up with physician  [] Other    Prognosis for POC: [x] Good [] Fair  [] Poor      Patient requires continued skilled intervention: [x] Yes  [] No    Treatment/Activity Tolerance:  [x] Patient able to complete treatment  [] Patient limited by fatigue  [] Patient limited by pain    [] Patient limited by other medical complications  [] Other:       PLAN:   [x] Continue per plan of care [] Alter current plan (see comments above)  [] Plan of care initiated [] Hold pending MD visit [] Discharge    Electronically signed by:  Anthony Pineda PTA , ATC  Note: If patient does not return for scheduled/ recommended follow up visits, this note will serve as a discharge from care along with most recent update on progress.

## 2022-01-25 ENCOUNTER — HOSPITAL ENCOUNTER (OUTPATIENT)
Dept: PHYSICAL THERAPY | Age: 52
Setting detail: THERAPIES SERIES
Discharge: HOME | End: 2022-01-25
Payer: COMMERCIAL

## 2022-01-25 NOTE — FLOWSHEET NOTE
[facility] and [facility], [address] [place] [address], [address] [place] [address] [address]  Phone: [phone]   Fax:     [phone]    Physical Therapy  Cancellation/No-show Note  Patient Name:  Darren Sinha  :  1970   Date:  2022    Cancelled visits to date: 1  No-shows to date: 1    For today's appointment patient:  [x]  Cancelled  [x]  Rescheduled appointment  [x]  No-show     Reason given by patient:  []  Patient ill  []  Conflicting appointment  []  No transportation    []  Conflict with work  []  No reason given  [x]  Other:     Comments:   Pt is in [facility] meeting a friend. Phone call information:   []  Phone call made today to patient at am/pm at the number provided:      []  Patient answered, conversation as follows:    []  Patient did not answer, message left as follows:  [x]  Phone call not needed - pt contacted us to cancel and provided reason for cancellation.      Electronically signed by:  Kaden Kauffman PTA, ATC

## 2022-01-28 ENCOUNTER — APPOINTMENT (OUTPATIENT)
Dept: PHYSICAL THERAPY | Age: 52
End: 2022-01-28
Payer: COMMERCIAL

## 2022-01-28 ENCOUNTER — HOSPITAL ENCOUNTER (OUTPATIENT)
Dept: PHYSICAL THERAPY | Age: 52
Setting detail: THERAPIES SERIES
Discharge: HOME | End: 2022-01-28
Payer: COMMERCIAL

## 2022-01-28 PROCEDURE — 97112 NEUROMUSCULAR REEDUCATION: CPT | Performed by: SPECIALIST/TECHNOLOGIST

## 2022-01-28 PROCEDURE — 97110 THERAPEUTIC EXERCISES: CPT | Performed by: SPECIALIST/TECHNOLOGIST

## 2022-01-28 NOTE — FLOWSHEET NOTE
[address] [facility], [address] [place] [address], [address] [place] [address] [address]  Phone: [phone]   Fax:     [phone]      Physical Therapy Treatment Note/ Progress Report:     Date:  2022    Patient Name:  Darren Sinha    :  1970  MRN: [id number]  Restrictions/Precautions:    Medical/Treatment Diagnosis Information:  ·  M54.6  Thoracic pain  ·  M54.5  Lumbar pain  · M25.512 L Shoulder pain  Insurance/Certification information:    R  Physician Information:    Nasreen Trevino MD  Has the plan of care been signed (Y/N):        []  Yes  [x]  No     Date of Patient follow up with Physician: NS    Is this a Progress Report:     []  Yes  [x]  No     If Yes:  Date Range for reporting period:  Beginnin21 ------------ Endin21    Progress report will be due (10 Rx or 30 days whichever is less):      Recertification will be due (POC Duration  / 90 days whichever is less): 21      Visit # Insurance Allowable Auth Required   In Person 11  []  Yes     []  No    Tele Health 0  []  Yes     []  No    Total 11       Functional Scale: Oswestry  30% Date assessed:    22  Latex Allergy:  [x]NO      []YES  Preferred Language for Healthcare:   [x]English       []other:    Pain level:  3-4/10     SUBJECTIVE:  Pt notes he has some spasm between his shoulders. Other than that he is feeling good.      OBJECTIVE:    Observation:    Test measurements:      RESTRICTIONS/PRECAUTIONS: colonoscomy bag  B THR    Exercises/Interventions:   Therapeutic Ex (16167) Sets/sec Reps Notes/CUES HEP    x    x    x   Prone quad stretch 30\" x 3      Chin tucks   20x  x   Prone prop on towel for Hip Flexor strectch 3'          LTR 10\" x10                 Fig 4 stretch 10\"  x10 ea     Manual Intervention (11091)       Prone leg pull  5 min     Kneeling hip flexor stretch  10\" 10     T/ L PA mobs gentle  5 min                          NMR re-education (83123)   CUES NEEDED    PB rows BL 20x  x   SL hip ext  15x ea     # 20x     bridges  15x  x   Supine PB clamshells BL 20x  x   JEFF B abd/ flex/ ext 15# 20x ea     Wall squats  15x  x   W/ Y prone  10x            Therapeutic Activity (61859)       Slant board  1 min     HR on slant board  30x     HP T/L  10 min                   Medbridge access code: [email or web address]           Therapeutic Exercise and NMR EXR  [x] (54943) Provided verbal/tactile cueing for activities related to strengthening, flexibility, endurance, ROM  for improvements in scapular, scapulothoracic and UE control with self care, reaching, carrying, lifting, house/yardwork, driving/computer work.    [] (55859) Provided verbal/tactile cueing for activities related to improving balance, coordination, kinesthetic sense, posture, motor skill, proprioception  to assist with  scapular, scapulothoracic and UE control with self care, reaching, carrying, lifting, house/yardwork, driving/computer work. Therapeutic Activities:    [x] (13609 or 63684) Provided verbal/tactile cueing for activities related to improving balance, coordination, kinesthetic sense, posture, motor skill, proprioception and motor activation to allow for proper function of scapular, scapulothoracic and UE control with self care, carrying, lifting, driving/computer work.      Home Exercise Program:    [x] (87181) Reviewed/Progressed HEP activities related to strengthening, flexibility, endurance, ROM of scapular, scapulothoracic and UE control with self care, reaching, carrying, lifting, house/yardwork, driving/computer work  [] (22633) Reviewed/Progressed HEP activities related to improving balance, coordination, kinesthetic sense, posture, motor skill, proprioception of scapular, scapulothoracic and UE control with self care, reaching, carrying, lifting, house/yardwork, driving/computer work      Manual Treatments:  PROM / STM / Oscillations-Mobs:  G-I, II, III, IV (PA's, Inf., Post.)  [x] (87710) Provided manual therapy to mobilize soft tissue/joints of cervical/CT, scapular GHJ and UE for the purpose of modulating pain, promoting relaxation,  increasing ROM, reducing/eliminating soft tissue swelling/inflammation/restriction, improving soft tissue extensibility and allowing for proper ROM for normal function with self care, reaching, carrying, lifting, house/yardwork, driving/computer work    Modalities:     [x] GAME READY (VASO)- for significant edema, swelling, pain control. Charges:  Timed Code Treatment Minutes: 40   Total Treatment Minutes:  40   BWC:  TE TIME:  NMR TIME:  MANUAL TIME:  UNTIMED MINUTES:  Medicare Total:         [] EVAL (LOW) 84180 (typically 20 minutes face-to-face)  [] EVAL (MOD) 08309 (typically 30 minutes face-to-face)  [] EVAL (HIGH) 26058 (typically 45 minutes face-to-face)  [] RE-EVAL     [x] GA(01100) x  2  [] IONTO  [x] NMR (91679) x  1   [] VASO   [] Manual (14757) x     [] Other:HP  [] TA x      [] Mech Traction (49479)  [] ES(attended) (01404)      [] ES (un) (41264):    ASSESSMENT:  Tolerated tx well. GOALS:      Patient stated goal: posture/ stand     Therapist goals for Patient:   Short Term Goals: To be achieved in: 2 weeks  1. Independent in HEP and progression per patient tolerance, in order to prevent re-injury. [x] Progressing: [] Met: [] Not Met: [] Adjusted     2. Patient will have a decrease in pain to facilitate improvement in movement, function, and ADLs as indicated by Functional Deficits. [x] Progressing: [] Met: [] Not Met: [] Adjusted     Long Term Goals: To be achieved in: 6-8 weeks  1. Disability index score of 15% or less for the DASH to assist with reaching prior level of function. [x] Progressing: [] Met: [] Not Met: [] Adjusted     2. Patient will demonstrate increased AROM to WNL to allow for proper joint functioning as indicated by patients Functional Deficits.    [x] Progressing: [] Met: [] Not Met: [] Adjusted 3. Patient will demonstrate an increase in Strength to 5/5 to allow for proper functional mobility as indicated by patients Functional Deficits. [x] Progressing: [] Met: [] Not Met: [] Adjusted     4. Patient will return to [facility] for  functional activities without increased symptoms or restriction. [x] Progressing: [] Met: [] Not Met: [] Adjusted     5. Stand/ posture with min limitations (patient specific functional goal)    [x] Progressing: [] Met: [] Not Met: [] Adjusted            Overall Progression Towards Functional goals/ Treatment Progress Update:  [x] Patient is progressing as expected towards functional goals listed. [] Progression is slowed due to complexities/Impairments listed. [] Progression has been slowed due to co-morbidities. [] Plan just implemented, too soon to assess goals progression <30days   [] Goals require adjustment due to lack of progress  [] Patient is not progressing as expected and requires additional follow up with physician  [] Other    Prognosis for POC: [x] Good [] Fair  [] Poor      Patient requires continued skilled intervention: [x] Yes  [] No    Treatment/Activity Tolerance:  [x] Patient able to complete treatment  [] Patient limited by fatigue  [] Patient limited by pain    [] Patient limited by other medical complications  [] Other:       PLAN:   [x] Continue per plan of care [] Alter current plan (see comments above)  [] Plan of care initiated [] Hold pending MD visit [] Discharge    Electronically signed by:  Amber Pena PTA , ATC  Note: If patient does not return for scheduled/ recommended follow up visits, this note will serve as a discharge from care along with most recent update on progress.

## 2022-02-08 ENCOUNTER — HOSPITAL ENCOUNTER (OUTPATIENT)
Dept: PHYSICAL THERAPY | Age: 52
Setting detail: THERAPIES SERIES
Discharge: HOME | End: 2022-02-08
Payer: COMMERCIAL

## 2022-02-08 PROCEDURE — 97112 NEUROMUSCULAR REEDUCATION: CPT | Performed by: SPECIALIST/TECHNOLOGIST

## 2022-02-08 PROCEDURE — 97110 THERAPEUTIC EXERCISES: CPT | Performed by: SPECIALIST/TECHNOLOGIST

## 2022-02-08 NOTE — FLOWSHEET NOTE
[address] [facility], [address] [place] [address], [address] [place] [address] [address]  Phone: [phone]   Fax:     [phone]      Physical Therapy Treatment Note/ Progress Report:     Date:  2022    Patient Name:  Eleazar To    :  1970  MRN: [id number]  Restrictions/Precautions:    Medical/Treatment Diagnosis Information:  ·  M54.6  Thoracic pain  ·  M54.5  Lumbar pain  · M25.512 L Shoulder pain  Insurance/Certification information:    R  Physician Information:    Nini Trotter MD  Has the plan of care been signed (Y/N):        []  Yes  [x]  No     Date of Patient follow up with Physician: NS    Is this a Progress Report:     []  Yes  [x]  No     If Yes:  Date Range for reporting period:  Beginnin21 ------------ Endin21    Progress report will be due (10 Rx or 30 days whichever is less):      Recertification will be due (POC Duration  / 90 days whichever is less): 21      Visit # Insurance Allowable Auth Required   In Person 12  []  Yes     []  No    Tele Health 0  []  Yes     []  No    Total 12       Functional Scale: Oswestry  30% Date assessed:    22  Latex Allergy:  [x]NO      []YES  Preferred Language for Healthcare:   [x]English       []other:    Pain level:  3-4/10     SUBJECTIVE:  Pt notes that he went on a trip and returned a couple days ago. Notes he has still been getting some muscle spasms behind his left scapula.      OBJECTIVE:    Observation:    Test measurements:      RESTRICTIONS/PRECAUTIONS: colonoscomy bag  B THR    Exercises/Interventions:   Therapeutic Ex (66495) Sets/sec Reps Notes/CUES HEP    x    x    x   Prone quad stretch 30\" x 3      Chin tucks   20x  x   Prone prop on towel for Hip Flexor strectch 3'          LTR 10\" x10                 Fig 4 stretch 10\"  x10 ea     Manual Intervention (81120)       Prone leg pull  5 min     Kneeling hip flexor stretch  10\" 10     T/ L PA mobs gentle  5 min                          NMR re-education (52384)   CUES NEEDED    PB rows BL 20x  x   SL hip ext  15x ea     # 20x     bridges  15x  x   Supine PB clamshells BL 20x  x   JEFF B abd/ flex/ ext 30# 20x ea     Wall squats  15x  x   W/ Y prone  10x     Hamstring curls  30# 30x     Therapeutic Activity (04454)       Slant board  1 min     HR on slant board  30x     HP T/L  10 min                   Medbridge access code: [email or web address]           Therapeutic Exercise and NMR EXR  [x] (69966) Provided verbal/tactile cueing for activities related to strengthening, flexibility, endurance, ROM  for improvements in scapular, scapulothoracic and UE control with self care, reaching, carrying, lifting, house/yardwork, driving/computer work.    [] (70963) Provided verbal/tactile cueing for activities related to improving balance, coordination, kinesthetic sense, posture, motor skill, proprioception  to assist with  scapular, scapulothoracic and UE control with self care, reaching, carrying, lifting, house/yardwork, driving/computer work. Therapeutic Activities:    [x] (69944 or 60762) Provided verbal/tactile cueing for activities related to improving balance, coordination, kinesthetic sense, posture, motor skill, proprioception and motor activation to allow for proper function of scapular, scapulothoracic and UE control with self care, carrying, lifting, driving/computer work.      Home Exercise Program:    [x] (78273) Reviewed/Progressed HEP activities related to strengthening, flexibility, endurance, ROM of scapular, scapulothoracic and UE control with self care, reaching, carrying, lifting, house/yardwork, driving/computer work  [] (79580) Reviewed/Progressed HEP activities related to improving balance, coordination, kinesthetic sense, posture, motor skill, proprioception of scapular, scapulothoracic and UE control with self care, reaching, carrying, lifting, house/yardwork, driving/computer work      Manual Treatments:  PROM / STM / Oscillations-Mobs:  G-I, II, III, IV (PA's, Inf., Post.)  [x] (27901) Provided manual therapy to mobilize soft tissue/joints of cervical/CT, scapular GHJ and UE for the purpose of modulating pain, promoting relaxation,  increasing ROM, reducing/eliminating soft tissue swelling/inflammation/restriction, improving soft tissue extensibility and allowing for proper ROM for normal function with self care, reaching, carrying, lifting, house/yardwork, driving/computer work    Modalities:     [x] GAME READY (VASO)- for significant edema, swelling, pain control. Charges:  Timed Code Treatment Minutes: 40   Total Treatment Minutes:  40   BWC:  TE TIME:  NMR TIME:  MANUAL TIME:  UNTIMED MINUTES:  Medicare Total:         [] EVAL (LOW) 43879 (typically 20 minutes face-to-face)  [] EVAL (MOD) 25087 (typically 30 minutes face-to-face)  [] EVAL (HIGH) 99713 (typically 45 minutes face-to-face)  [] RE-EVAL     [x] YJ(09260) x  2  [] IONTO  [x] NMR (71715) x  1   [] VASO   [] Manual (78249) x     [] Other:HP  [] TA x      [] Mech Traction (83041)  [] ES(attended) (86965)      [] ES (un) (58334):    ASSESSMENT:  Tolerated tx well. GOALS:      Patient stated goal: posture/ stand     Therapist goals for Patient:   Short Term Goals: To be achieved in: 2 weeks  1. Independent in HEP and progression per patient tolerance, in order to prevent re-injury. [x] Progressing: [] Met: [] Not Met: [] Adjusted     2. Patient will have a decrease in pain to facilitate improvement in movement, function, and ADLs as indicated by Functional Deficits. [x] Progressing: [] Met: [] Not Met: [] Adjusted     Long Term Goals: To be achieved in: 6-8 weeks  1. Disability index score of 15% or less for the DASH to assist with reaching prior level of function. [x] Progressing: [] Met: [] Not Met: [] Adjusted     2.  Patient will demonstrate increased AROM to WNL to allow for proper joint functioning as indicated by patients Functional Deficits. [x] Progressing: [] Met: [] Not Met: [] Adjusted     3. Patient will demonstrate an increase in Strength to 5/5 to allow for proper functional mobility as indicated by patients Functional Deficits. [x] Progressing: [] Met: [] Not Met: [] Adjusted     4. Patient will return to [facility] for  functional activities without increased symptoms or restriction. [x] Progressing: [] Met: [] Not Met: [] Adjusted     5. Stand/ posture with min limitations (patient specific functional goal)    [x] Progressing: [] Met: [] Not Met: [] Adjusted            Overall Progression Towards Functional goals/ Treatment Progress Update:  [x] Patient is progressing as expected towards functional goals listed. [] Progression is slowed due to complexities/Impairments listed. [] Progression has been slowed due to co-morbidities. [] Plan just implemented, too soon to assess goals progression <30days   [] Goals require adjustment due to lack of progress  [] Patient is not progressing as expected and requires additional follow up with physician  [] Other    Prognosis for POC: [x] Good [] Fair  [] Poor      Patient requires continued skilled intervention: [x] Yes  [] No    Treatment/Activity Tolerance:  [x] Patient able to complete treatment  [] Patient limited by fatigue  [] Patient limited by pain    [] Patient limited by other medical complications  [] Other:       PLAN:   [x] Continue per plan of care [] Alter current plan (see comments above)  [] Plan of care initiated [] Hold pending MD visit [] Discharge    Electronically signed by:  Cora Litten, PTA , ATC  Note: If patient does not return for scheduled/ recommended follow up visits, this note will serve as a discharge from care along with most recent update on progress.

## 2022-02-11 ENCOUNTER — HOSPITAL ENCOUNTER (OUTPATIENT)
Dept: PHYSICAL THERAPY | Age: 52
Setting detail: THERAPIES SERIES
Discharge: HOME | End: 2022-02-11
Payer: COMMERCIAL

## 2022-02-11 NOTE — FLOWSHEET NOTE
[facility], [address] [place] [address], [address] [place] [address] [address]  Phone: [phone]   Fax:     [phone]    Physical Therapy  Cancellation/No-show Note  Patient Name:  Batsheva Kellogg  :  1970   Date:  2022    Cancelled visits to date: 2  No-shows to date: 1    For today's appointment patient:  [x]  Cancelled  [x]  Rescheduled appointment  []  No-show     Reason given by patient:  []  Patient ill  []  Conflicting appointment  []  No transportation    [x]  Conflict with work  []  No reason given  []  Other:     Comments:       Phone call information:   []  Phone call made today to patient at am/pm at the number provided:      []  Patient answered, conversation as follows:    []  Patient did not answer, message left as follows:  [x]  Phone call not needed - pt contacted us to cancel and provided reason for cancellation.      Electronically signed by:  Evonne Crooks PTA, ATC

## 2022-02-15 ENCOUNTER — HOSPITAL ENCOUNTER (OUTPATIENT)
Dept: PHYSICAL THERAPY | Age: 52
Setting detail: THERAPIES SERIES
Discharge: HOME | End: 2022-02-15
Payer: COMMERCIAL

## 2022-02-15 PROCEDURE — 97110 THERAPEUTIC EXERCISES: CPT | Performed by: SPECIALIST/TECHNOLOGIST

## 2022-02-15 PROCEDURE — 97112 NEUROMUSCULAR REEDUCATION: CPT | Performed by: SPECIALIST/TECHNOLOGIST

## 2022-02-15 NOTE — FLOWSHEET NOTE
[facility] [facility], [address] [place] [address], [address] [place] [address] [address]  Phone: [phone]   Fax:     [phone]      Physical Therapy Treatment Note/ Progress Report:     Date:  2/15/2022    Patient Name:  Brittany Howard    :  1970  MRN: [id number]  Restrictions/Precautions:    Medical/Treatment Diagnosis Information:  ·  M54.6  Thoracic pain  ·  M54.5  Lumbar pain  · M25.512 L Shoulder pain  Insurance/Certification information:    [facility]  Physician Information:    Zora Aguila MD  Has the plan of care been signed (Y/N):        []  Yes  [x]  No     Date of Patient follow up with Physician: NS    Is this a Progress Report:     []  Yes  [x]  No     If Yes:  Date Range for reporting period:  Beginnin21 ------------ Endin21    Progress report will be due (10 Rx or 30 days whichever is less):      Recertification will be due (POC Duration  / 90 days whichever is less): 21      Visit # Insurance Allowable Auth Required   In Person 13  []  Yes     []  No    Tele Health 0  []  Yes     []  No    Total 13       Functional Scale: Oswestry  30% Date assessed:    22  Latex Allergy:  [x]NO      []YES  Preferred Language for Healthcare:   [x]English       []other:    Pain level:  3-4/10     SUBJECTIVE:  Pt notes that he fell on his back on the ice. He was really sore for a couple days but is feeling a little better today.      OBJECTIVE:    Observation:    Test measurements:      RESTRICTIONS/PRECAUTIONS: colonoscomy bag  B THR    Exercises/Interventions:   Therapeutic Ex (45694) Sets/sec Reps Notes/CUES HEP    x    x    x   Prone quad stretch 30\" x 3      Chin tucks   20x  x   Prone prop on towel for Hip Flexor strectch 3'          LTR 10\" x10                 Fig 4 stretch 10\"  x10 ea     Manual Intervention (38672)       Prone leg pull  5 min     Kneeling hip flexor stretch  10\" 10     T/ L PA mobs gentle  5 min NMR re-education (30790)   CUES NEEDED    PB rows BL 20x  x   SL hip ext  15x ea     # 20x     bridges  15x  x   Supine PB clamshells BL 20x  x   JEFF B abd/ flex/ ext 30# 20x ea     Wall squats  15x  x   W/ Y prone  10x     Hamstring curls  30# 30x     Therapeutic Activity (44277)       Slant board  1 min     HR on slant board  30x     HP T/L  10 min                   MedCallApp access code: [email or web address]           Therapeutic Exercise and NMR EXR  [x] (09840) Provided verbal/tactile cueing for activities related to strengthening, flexibility, endurance, ROM  for improvements in scapular, scapulothoracic and UE control with self care, reaching, carrying, lifting, house/yardwork, driving/computer work.    [] (38896) Provided verbal/tactile cueing for activities related to improving balance, coordination, kinesthetic sense, posture, motor skill, proprioception  to assist with  scapular, scapulothoracic and UE control with self care, reaching, carrying, lifting, house/yardwork, driving/computer work. Therapeutic Activities:    [x] (79271 or 68351) Provided verbal/tactile cueing for activities related to improving balance, coordination, kinesthetic sense, posture, motor skill, proprioception and motor activation to allow for proper function of scapular, scapulothoracic and UE control with self care, carrying, lifting, driving/computer work.      Home Exercise Program:    [x] (94334) Reviewed/Progressed HEP activities related to strengthening, flexibility, endurance, ROM of scapular, scapulothoracic and UE control with self care, reaching, carrying, lifting, house/yardwork, driving/computer work  [] (82528) Reviewed/Progressed HEP activities related to improving balance, coordination, kinesthetic sense, posture, motor skill, proprioception of scapular, scapulothoracic and UE control with self care, reaching, carrying, lifting, house/yardwork, driving/computer work      Manual Treatments:  PROM / STM / Oscillations-Mobs:  G-I, II, III, IV (PA's, Inf., Post.)  [x] (52416) Provided manual therapy to mobilize soft tissue/joints of cervical/CT, scapular GHJ and UE for the purpose of modulating pain, promoting relaxation,  increasing ROM, reducing/eliminating soft tissue swelling/inflammation/restriction, improving soft tissue extensibility and allowing for proper ROM for normal function with self care, reaching, carrying, lifting, house/yardwork, driving/computer work    Modalities:     [x] GAME READY (VASO)- for significant edema, swelling, pain control. Charges:  Timed Code Treatment Minutes: 55   Total Treatment Minutes:  55   BWC:  TE TIME:  NMR TIME:  MANUAL TIME:  UNTIMED MINUTES:  Medicare Total:         [] EVAL (LOW) 77327 (typically 20 minutes face-to-face)  [] EVAL (MOD) 25872 (typically 30 minutes face-to-face)  [] EVAL (HIGH) 70404 (typically 45 minutes face-to-face)  [] RE-EVAL     [x] EN(34290) x  3  [] IONTO  [x] NMR (97082) x  1   [] VASO   [] Manual (08397) x     [] Other:HP  [] TA x      [] Mech Traction (72378)  [] ES(attended) (66581)      [] ES (un) (30514):    ASSESSMENT:  Tolerated tx well. GOALS:      Patient stated goal: posture/ stand     Therapist goals for Patient:   Short Term Goals: To be achieved in: 2 weeks  1. Independent in HEP and progression per patient tolerance, in order to prevent re-injury. [x] Progressing: [] Met: [] Not Met: [] Adjusted     2. Patient will have a decrease in pain to facilitate improvement in movement, function, and ADLs as indicated by Functional Deficits. [x] Progressing: [] Met: [] Not Met: [] Adjusted     Long Term Goals: To be achieved in: 6-8 weeks  1. Disability index score of 15% or less for the DASH to assist with reaching prior level of function. [x] Progressing: [] Met: [] Not Met: [] Adjusted     2. Patient will demonstrate increased AROM to WNL to allow for proper joint functioning as indicated by patients Functional Deficits. [x] Progressing: [] Met: [] Not Met: [] Adjusted     3. Patient will demonstrate an increase in Strength to 5/5 to allow for proper functional mobility as indicated by patients Functional Deficits. [x] Progressing: [] Met: [] Not Met: [] Adjusted     4. Patient will return to [facility] for  functional activities without increased symptoms or restriction. [x] Progressing: [] Met: [] Not Met: [] Adjusted     5. Stand/ posture with min limitations (patient specific functional goal)    [x] Progressing: [] Met: [] Not Met: [] Adjusted            Overall Progression Towards Functional goals/ Treatment Progress Update:  [x] Patient is progressing as expected towards functional goals listed. [] Progression is slowed due to complexities/Impairments listed. [] Progression has been slowed due to co-morbidities. [] Plan just implemented, too soon to assess goals progression <30days   [] Goals require adjustment due to lack of progress  [] Patient is not progressing as expected and requires additional follow up with physician  [] Other    Prognosis for POC: [x] Good [] Fair  [] Poor      Patient requires continued skilled intervention: [x] Yes  [] No    Treatment/Activity Tolerance:  [x] Patient able to complete treatment  [] Patient limited by fatigue  [] Patient limited by pain    [] Patient limited by other medical complications  [] Other:       PLAN:   [x] Continue per plan of care [] Alter current plan (see comments above)  [] Plan of care initiated [] Hold pending MD visit [] Discharge    Electronically signed by:  Pam Luna PTA , ATC  Note: If patient does not return for scheduled/ recommended follow up visits, this note will serve as a discharge from care along with most recent update on progress.

## 2022-02-18 ENCOUNTER — HOSPITAL ENCOUNTER (OUTPATIENT)
Dept: PHYSICAL THERAPY | Age: 52
Setting detail: THERAPIES SERIES
Discharge: HOME | End: 2022-02-18
Payer: COMMERCIAL

## 2022-02-18 PROCEDURE — 97112 NEUROMUSCULAR REEDUCATION: CPT | Performed by: SPECIALIST/TECHNOLOGIST

## 2022-02-18 PROCEDURE — 97110 THERAPEUTIC EXERCISES: CPT | Performed by: SPECIALIST/TECHNOLOGIST

## 2022-02-18 NOTE — FLOWSHEET NOTE
723 [facility], [address] [place] [address], [address] [place] [address] [address]  Phone: [phone]   Fax:     [phone]      Physical Therapy Treatment Note/ Progress Report:     Date:  2022    Patient Name:  Isac Ramirez    :  1970  MRN: [id number]  Restrictions/Precautions:    Medical/Treatment Diagnosis Information:  ·  M54.6  Thoracic pain  ·  M54.5  Lumbar pain  · M25.512 L Shoulder pain  Insurance/Certification information:    [facility]  Physician Information:    Yuriy Sánchez MD  Has the plan of care been signed (Y/N):        []  Yes  [x]  No     Date of Patient follow up with Physician: NS    Is this a Progress Report:     []  Yes  [x]  No     If Yes:  Date Range for reporting period:  Beginnin21 ------------ Endin21    Progress report will be due (10 Rx or 30 days whichever is less):      Recertification will be due (POC Duration  / 90 days whichever is less): 21      Visit # Insurance Allowable Auth Required   In Person 13  []  Yes     []  No    Tele Health 0  []  Yes     []  No    Total 13       Functional Scale: Oswestry  30% Date assessed:    22 Next visit POC  Latex Allergy:  [x]NO      []YES  Preferred Language for Healthcare:   [x]English       []other:    Pain level:  3-4/10     SUBJECTIVE:  Pt notes he is feeling pretty good overall.      OBJECTIVE:    Observation:    Test measurements:      RESTRICTIONS/PRECAUTIONS: colonoscomy bag  B THR    Exercises/Interventions:   Therapeutic Ex (77378) Sets/sec Reps Notes/CUES HEP    x    x    x   Prone quad stretch 30\" x 3      Chin tucks   20x  x   Prone prop on towel for Hip Flexor strectch 3'          LTR 10\" x10                 Fig 4 stretch 10\"  x10 ea     Manual Intervention (01816)       Prone leg pull  5 min     Kneeling hip flexor stretch  10\" 10     T/ L PA mobs gentle  5 min                          NMR re-education (45293)   CUES NEEDED PB rows BL 20x  x   SL hip ext  15x ea     # 20x     bridges  15x  x   Supine PB clamshells BL 20x  x   JEFF B abd/ flex/ ext 30# 20x ea     Wall squats  15x  x   W/ Y prone  10x     Hamstring curls  30# 30x     Therapeutic Activity (73906)       Slant board  1 min     HR on slant board  30x     HP T/L  10 min                   Medbridge access code: [email or web address]           Therapeutic Exercise and NMR EXR  [x] (19778) Provided verbal/tactile cueing for activities related to strengthening, flexibility, endurance, ROM  for improvements in scapular, scapulothoracic and UE control with self care, reaching, carrying, lifting, house/yardwork, driving/computer work.    [] (57937) Provided verbal/tactile cueing for activities related to improving balance, coordination, kinesthetic sense, posture, motor skill, proprioception  to assist with  scapular, scapulothoracic and UE control with self care, reaching, carrying, lifting, house/yardwork, driving/computer work. Therapeutic Activities:    [x] (49348 or 61247) Provided verbal/tactile cueing for activities related to improving balance, coordination, kinesthetic sense, posture, motor skill, proprioception and motor activation to allow for proper function of scapular, scapulothoracic and UE control with self care, carrying, lifting, driving/computer work.      Home Exercise Program:    [x] (80231) Reviewed/Progressed HEP activities related to strengthening, flexibility, endurance, ROM of scapular, scapulothoracic and UE control with self care, reaching, carrying, lifting, house/yardwork, driving/computer work  [] (57987) Reviewed/Progressed HEP activities related to improving balance, coordination, kinesthetic sense, posture, motor skill, proprioception of scapular, scapulothoracic and UE control with self care, reaching, carrying, lifting, house/yardwork, driving/computer work      Manual Treatments:  PROM / STM / Oscillations-Mobs:  G-I, II, III, IV (PA's, Inf., Post.)  [x] (44044) Provided manual therapy to mobilize soft tissue/joints of cervical/CT, scapular GHJ and UE for the purpose of modulating pain, promoting relaxation,  increasing ROM, reducing/eliminating soft tissue swelling/inflammation/restriction, improving soft tissue extensibility and allowing for proper ROM for normal function with self care, reaching, carrying, lifting, house/yardwork, driving/computer work    Modalities:     [x] GAME READY (VASO)- for significant edema, swelling, pain control. Charges:  Timed Code Treatment Minutes: 55   Total Treatment Minutes:  55   BWC:  TE TIME:  NMR TIME:  MANUAL TIME:  UNTIMED MINUTES:  Medicare Total:         [] EVAL (LOW) 04581 (typically 20 minutes face-to-face)  [] EVAL (MOD) 89395 (typically 30 minutes face-to-face)  [] EVAL (HIGH) 79171 (typically 45 minutes face-to-face)  [] RE-EVAL     [x] PP(92939) x  3  [] IONTO  [x] NMR (08654) x  1   [] VASO   [] Manual (50966) x     [] Other:HP  [] TA x      [] Mech Traction (76488)  [] ES(attended) (13648)      [] ES (un) (00817):    ASSESSMENT:  Tolerated tx well. GOALS:      Patient stated goal: posture/ stand     Therapist goals for Patient:   Short Term Goals: To be achieved in: 2 weeks  1. Independent in HEP and progression per patient tolerance, in order to prevent re-injury. [x] Progressing: [] Met: [] Not Met: [] Adjusted     2. Patient will have a decrease in pain to facilitate improvement in movement, function, and ADLs as indicated by Functional Deficits. [x] Progressing: [] Met: [] Not Met: [] Adjusted     Long Term Goals: To be achieved in: 6-8 weeks  1. Disability index score of 15% or less for the DASH to assist with reaching prior level of function. [x] Progressing: [] Met: [] Not Met: [] Adjusted     2. Patient will demonstrate increased AROM to WNL to allow for proper joint functioning as indicated by patients Functional Deficits.    [x] Progressing: [] Met: [] Not Met: [] Adjusted 3. Patient will demonstrate an increase in Strength to 5/5 to allow for proper functional mobility as indicated by patients Functional Deficits. [x] Progressing: [] Met: [] Not Met: [] Adjusted     4. Patient will return to [facility] for  functional activities without increased symptoms or restriction. [x] Progressing: [] Met: [] Not Met: [] Adjusted     5. Stand/ posture with min limitations (patient specific functional goal)    [x] Progressing: [] Met: [] Not Met: [] Adjusted            Overall Progression Towards Functional goals/ Treatment Progress Update:  [x] Patient is progressing as expected towards functional goals listed. [] Progression is slowed due to complexities/Impairments listed. [] Progression has been slowed due to co-morbidities. [] Plan just implemented, too soon to assess goals progression <30days   [] Goals require adjustment due to lack of progress  [] Patient is not progressing as expected and requires additional follow up with physician  [] Other    Prognosis for POC: [x] Good [] Fair  [] Poor      Patient requires continued skilled intervention: [x] Yes  [] No    Treatment/Activity Tolerance:  [x] Patient able to complete treatment  [] Patient limited by fatigue  [] Patient limited by pain    [] Patient limited by other medical complications  [] Other:       PLAN:   [x] Continue per plan of care [] Alter current plan (see comments above)  [] Plan of care initiated [] Hold pending MD visit [] Discharge    Electronically signed by:  Julius Murillo PTA , ATC  Note: If patient does not return for scheduled/ recommended follow up visits, this note will serve as a discharge from care along with most recent update on progress.

## 2022-02-22 ENCOUNTER — HOSPITAL ENCOUNTER (OUTPATIENT)
Dept: PHYSICAL THERAPY | Age: 52
Setting detail: THERAPIES SERIES
Discharge: HOME | End: 2022-02-22
Payer: COMMERCIAL

## 2022-02-22 PROCEDURE — 97110 THERAPEUTIC EXERCISES: CPT | Performed by: SPECIALIST/TECHNOLOGIST

## 2022-02-22 PROCEDURE — 97112 NEUROMUSCULAR REEDUCATION: CPT | Performed by: SPECIALIST/TECHNOLOGIST

## 2022-02-22 NOTE — PLAN OF CARE
723 [facility], [address], [address] [place] [address] [address]  Phone: [phone]   Fax:     [phone]     Physical Therapy Re-Certification Plan of Care    Dear    Oz Dolan MD    We had the pleasure of treating the following patient for physical therapy services at [address] [place] [address]. A summary of our findings can be found in the updated assessment below. This includes our plan of care. If you have any questions or concerns regarding these findings, please do not hesitate to contact me at the office phone number checked above.   Thank you for the referral.     Physician Signature:________________________________Date:__________________  By signing above (or electronic signature), therapists plan is approved by physician    Date Range Of Visits: 21-22  Total Visits to Date: 15  Overall Response to Treatment:   [x]Patient is responding well to treatment and improvement is noted with regards  to goals   []Patient should continue to improve in reasonable time if they continue HEP   []Patient has plateaued and is no longer responding to skilled PT intervention    []Patient is getting worse and would benefit from return to referring MD   []Patient unable to adhere to initial POC   [x]Other: Continue with physical therapy 1x/wk for 4-6wks     Physical Therapy Treatment Note/ Progress Report:     Date:  2022    Patient Name:  Jany Vidales    :  1970  MRN: [id number]  Restrictions/Precautions:    Medical/Treatment Diagnosis Information:  ·  M54.6  Thoracic pain  ·  M54.5  Lumbar pain  · M25.512 L Shoulder pain  Insurance/Certification information:    [facility]  Physician Information:    Oz Dolan MD  Has the plan of care been signed (Y/N):        []  Yes  [x]  No     Date of Patient follow up with Physician: NS    Is this a Progress Report:     []  Yes  [x]  No     If Yes:  Date Range for reporting period:  Beginnin21 ------------ Endin21    Progress report will be due (10 Rx or 30 days whichever is less):      Recertification will be due (POC Duration  / 90 days whichever is less): 21      Visit # Insurance Allowable Auth Required   In Person 14  []  Yes     []  No    Tele Health 0  []  Yes     []  No    Total 14       Functional Scale: Oswestry 30% 18%                Date assessed:     22  Latex Allergy:  [x]NO      []YES  Preferred Language for Healthcare:   [x]English       []other:    Pain level:  3-4/10     SUBJECTIVE:  Pt notes he is feeling stronger. He continues to improve a little each day.      OBJECTIVE:    Observation:    Test measurements:      RESTRICTIONS/PRECAUTIONS: colonoscomy bag  B THR    Exercises/Interventions:   Therapeutic Ex (64893) Sets/sec Reps Notes/CUES HEP    x    x    x   Prone quad stretch 30\" x 3      Chin tucks   20x  x   Prone prop on towel for Hip Flexor strectch 3'          LTR 10\" x10                 Fig 4 stretch 10\"  x10 ea     Manual Intervention (18424)       Prone leg pull  5 min     Kneeling hip flexor stretch  10\" 10     T/ L PA mobs gentle  5 min                          NMR re-education (98277)   CUES NEEDED    PB rows BL 20x  x   SL hip ext  15x ea     # 20x     bridges  15x  x   Supine PB clamshells BL 20x  x   JEFF B abd/ flex/ ext 30# 20x ea     Wall squats  15x  x   W/ Y prone  10x     Hamstring curls  30# 30x     Therapeutic Activity (69038)       Slant board  1 min     HR on slant board  30x     HP T/L  10 min                   Medbridge access code: [email or web address]           Therapeutic Exercise and NMR EXR  [x] (77055) Provided verbal/tactile cueing for activities related to strengthening, flexibility, endurance, ROM  for improvements in scapular, scapulothoracic and UE control with self care, reaching, carrying, lifting, house/yardwork, driving/computer work.    [] (85188) Provided verbal/tactile cueing for activities related to improving balance, coordination, kinesthetic sense, posture, motor skill, proprioception  to assist with  scapular, scapulothoracic and UE control with self care, reaching, carrying, lifting, house/yardwork, driving/computer work. Therapeutic Activities:    [x] (74005 or 82064) Provided verbal/tactile cueing for activities related to improving balance, coordination, kinesthetic sense, posture, motor skill, proprioception and motor activation to allow for proper function of scapular, scapulothoracic and UE control with self care, carrying, lifting, driving/computer work. Home Exercise Program:    [x] (89495) Reviewed/Progressed HEP activities related to strengthening, flexibility, endurance, ROM of scapular, scapulothoracic and UE control with self care, reaching, carrying, lifting, house/yardwork, driving/computer work  [] (35478) Reviewed/Progressed HEP activities related to improving balance, coordination, kinesthetic sense, posture, motor skill, proprioception of scapular, scapulothoracic and UE control with self care, reaching, carrying, lifting, house/yardwork, driving/computer work      Manual Treatments:  PROM / STM / Oscillations-Mobs:  G-I, II, III, IV (PA's, Inf., Post.)  [x] (27996) Provided manual therapy to mobilize soft tissue/joints of cervical/CT, scapular GHJ and UE for the purpose of modulating pain, promoting relaxation,  increasing ROM, reducing/eliminating soft tissue swelling/inflammation/restriction, improving soft tissue extensibility and allowing for proper ROM for normal function with self care, reaching, carrying, lifting, house/yardwork, driving/computer work    Modalities:     [x] GAME READY (VASO)- for significant edema, swelling, pain control.      Charges:  Timed Code Treatment Minutes: 55   Total Treatment Minutes:  55   BWC:  TE TIME:  NMR TIME:  MANUAL TIME:  UNTIMED MINUTES:  Medicare Total:         [] EVAL (LOW) 86897 (typically 20 minutes face-to-face)  [] EVAL (MOD) 15179 (typically 30 minutes face-to-face)  [] EVAL (HIGH) 54028 (typically 45 minutes face-to-face)  [] RE-EVAL     [x] XX(69698) x  3  [] IONTO  [x] NMR (21348) x  1   [] VASO   [] Manual (13988) x     [] Other:HP  [] TA x      [] Mech Traction (40864)  [] ES(attended) (33085)      [] ES (un) (13396):    ASSESSMENT:  Tolerated tx well. GOALS:      Patient stated goal: posture/ stand     Therapist goals for Patient:   Short Term Goals: To be achieved in: 2 weeks  1. Independent in HEP and progression per patient tolerance, in order to prevent re-injury. [x] Progressing: [] Met: [] Not Met: [] Adjusted     2. Patient will have a decrease in pain to facilitate improvement in movement, function, and ADLs as indicated by Functional Deficits. [x] Progressing: [] Met: [] Not Met: [] Adjusted     Long Term Goals: To be achieved in: 6-8 weeks  1. Disability index score of 15% or less for the DASH to assist with reaching prior level of function. [x] Progressing: [] Met: [] Not Met: [] Adjusted     2. Patient will demonstrate increased AROM to WNL to allow for proper joint functioning as indicated by patients Functional Deficits. [x] Progressing: [] Met: [] Not Met: [] Adjusted     3. Patient will demonstrate an increase in Strength to 5/5 to allow for proper functional mobility as indicated by patients Functional Deficits. [x] Progressing: [] Met: [] Not Met: [] Adjusted     4. Patient will return to [facility] for  functional activities without increased symptoms or restriction. [x] Progressing: [] Met: [] Not Met: [] Adjusted     5. Stand/ posture with min limitations (patient specific functional goal)    [x] Progressing: [] Met: [] Not Met: [] Adjusted            Overall Progression Towards Functional goals/ Treatment Progress Update:  [x] Patient is progressing as expected towards functional goals listed. [] Progression is slowed due to complexities/Impairments listed.   [] Progression has been slowed due to co-morbidities. [] Plan just implemented, too soon to assess goals progression <30days   [] Goals require adjustment due to lack of progress  [] Patient is not progressing as expected and requires additional follow up with physician  [] Other    Prognosis for POC: [x] Good [] Fair  [] Poor      Patient requires continued skilled intervention: [x] Yes  [] No    Treatment/Activity Tolerance:  [x] Patient able to complete treatment  [] Patient limited by fatigue  [] Patient limited by pain    [] Patient limited by other medical complications  [] Other:       PLAN:   [x] Continue per plan of care [] Alter current plan (see comments above)  [] Plan of care initiated [] Hold pending MD visit [] Discharge    Electronically signed by:  Melinda Adamson PTA , ATC  Note: If patient does not return for scheduled/ recommended follow up visits, this note will serve as a discharge from care along with most recent update on progress.

## 2022-02-25 ENCOUNTER — HOSPITAL ENCOUNTER (OUTPATIENT)
Dept: PHYSICAL THERAPY | Age: 52
Setting detail: THERAPIES SERIES
Discharge: HOME | End: 2022-02-25
Payer: COMMERCIAL

## 2022-02-25 PROCEDURE — 97112 NEUROMUSCULAR REEDUCATION: CPT | Performed by: SPECIALIST/TECHNOLOGIST

## 2022-02-25 PROCEDURE — 97110 THERAPEUTIC EXERCISES: CPT | Performed by: SPECIALIST/TECHNOLOGIST

## 2022-02-25 NOTE — FLOWSHEET NOTE
723 [facility], [facility]5 [address], [address] [place] [address] [address]  Phone: [phone]   Fax:     [phone]         Physical Therapy Treatment Note/ Progress Report:     Date:  2022    Patient Name:  Luisana Zamarripa    :  1970  MRN: [id number]  Restrictions/Precautions:    Medical/Treatment Diagnosis Information:  ·  M54.6  Thoracic pain  ·  M54.5  Lumbar pain  · M25.512 L Shoulder pain  Insurance/Certification information:    R  Physician Information:    Tristan Blanco MD  Has the plan of care been signed (Y/N):        []  Yes  [x]  No     Date of Patient follow up with Physician: NS    Is this a Progress Report:     []  Yes  [x]  No     If Yes:  Date Range for reporting period:  Beginnin21 ------------ Endin21    Progress report will be due (10 Rx or 30 days whichever is less):      Recertification will be due (POC Duration  / 90 days whichever is less): 21      Visit # Insurance Allowable Auth Required   In Person 15  []  Yes     []  No    Tele Health 0  []  Yes     []  No    Total 15       Functional Scale: Oswestry 30% 18%                Date assessed:     22  Latex Allergy:  [x]NO      []YES  Preferred Language for Healthcare:   [x]English       []other:    Pain level:  3-4/10     SUBJECTIVE:  Pt notes he feels a little better each day.       OBJECTIVE:     Observation:    Test measurements:      RESTRICTIONS/PRECAUTIONS: colonoscomy bag  B THR    Exercises/Interventions:   Therapeutic Ex (28249) Sets/sec Reps Notes/CUES HEP    x    x    x   Prone quad stretch 30\" x 3      Chin tucks   20x  x   Prone prop on towel for Hip Flexor strectch 3'          LTR 10\" x10                 Fig 4 stretch 10\"  x10 ea     Manual Intervention (05563)       Prone leg pull  5 min     Kneeling hip flexor stretch  10\" 10     T/ L PA mobs gentle  5 min                          NMR re-education (97289)   CUES NEEDED    PB rows BL 20x  x   SL hip ext  15x ea     # 20x     bridges  15x  x   Supine PB clamshells BL 20x  x   JEFF B abd/ flex/ ext 30# 20x ea     Wall squats  15x  x   W/ Y prone  10x     Hamstring curls  30# 30x     Therapeutic Activity (91789)       Slant board  1 min     HR on slant board  30x     HP T/L  10 min                   Medbridge access code: [email or web address]           Therapeutic Exercise and NMR EXR  [x] (73610) Provided verbal/tactile cueing for activities related to strengthening, flexibility, endurance, ROM  for improvements in scapular, scapulothoracic and UE control with self care, reaching, carrying, lifting, house/yardwork, driving/computer work.    [] (58741) Provided verbal/tactile cueing for activities related to improving balance, coordination, kinesthetic sense, posture, motor skill, proprioception  to assist with  scapular, scapulothoracic and UE control with self care, reaching, carrying, lifting, house/yardwork, driving/computer work. Therapeutic Activities:    [x] (29553 or 72054) Provided verbal/tactile cueing for activities related to improving balance, coordination, kinesthetic sense, posture, motor skill, proprioception and motor activation to allow for proper function of scapular, scapulothoracic and UE control with self care, carrying, lifting, driving/computer work.      Home Exercise Program:    [x] (09042) Reviewed/Progressed HEP activities related to strengthening, flexibility, endurance, ROM of scapular, scapulothoracic and UE control with self care, reaching, carrying, lifting, house/yardwork, driving/computer work  [] (55310) Reviewed/Progressed HEP activities related to improving balance, coordination, kinesthetic sense, posture, motor skill, proprioception of scapular, scapulothoracic and UE control with self care, reaching, carrying, lifting, house/yardwork, driving/computer work      Manual Treatments:  PROM / STM / Oscillations-Mobs:  G-I, II, III, IV (PA's, Inf., Post.)  [x] (58963) Provided manual therapy to mobilize soft tissue/joints of cervical/CT, scapular GHJ and UE for the purpose of modulating pain, promoting relaxation,  increasing ROM, reducing/eliminating soft tissue swelling/inflammation/restriction, improving soft tissue extensibility and allowing for proper ROM for normal function with self care, reaching, carrying, lifting, house/yardwork, driving/computer work    Modalities:     [x] GAME READY (VASO)- for significant edema, swelling, pain control. Charges:  Timed Code Treatment Minutes: 55   Total Treatment Minutes:  55   BWC:  TE TIME:  NMR TIME:  MANUAL TIME:  UNTIMED MINUTES:  Medicare Total:         [] EVAL (LOW) 94574 (typically 20 minutes face-to-face)  [] EVAL (MOD) 10477 (typically 30 minutes face-to-face)  [] EVAL (HIGH) 52329 (typically 45 minutes face-to-face)  [] RE-EVAL     [x] YN(05414) x  3  [] IONTO  [x] NMR (21893) x  1   [] VASO   [] Manual (05366) x     [] Other:HP  [] TA x      [] Mech Traction (56966)  [] ES(attended) (36992)      [] ES (un) (21727):    ASSESSMENT:  Tolerated tx well. GOALS:      Patient stated goal: posture/ stand     Therapist goals for Patient:   Short Term Goals: To be achieved in: 2 weeks  1. Independent in HEP and progression per patient tolerance, in order to prevent re-injury. [x] Progressing: [] Met: [] Not Met: [] Adjusted     2. Patient will have a decrease in pain to facilitate improvement in movement, function, and ADLs as indicated by Functional Deficits. [x] Progressing: [] Met: [] Not Met: [] Adjusted     Long Term Goals: To be achieved in: 6-8 weeks  1. Disability index score of 15% or less for the DASH to assist with reaching prior level of function. [x] Progressing: [] Met: [] Not Met: [] Adjusted     2. Patient will demonstrate increased AROM to WNL to allow for proper joint functioning as indicated by patients Functional Deficits.    [x] Progressing: [] Met: [] Not Met: [] Adjusted 3. Patient will demonstrate an increase in Strength to 5/5 to allow for proper functional mobility as indicated by patients Functional Deficits. [x] Progressing: [] Met: [] Not Met: [] Adjusted     4. Patient will return to [facility] for  functional activities without increased symptoms or restriction. [x] Progressing: [] Met: [] Not Met: [] Adjusted     5. Stand/ posture with min limitations (patient specific functional goal)    [x] Progressing: [] Met: [] Not Met: [] Adjusted            Overall Progression Towards Functional goals/ Treatment Progress Update:  [x] Patient is progressing as expected towards functional goals listed. [] Progression is slowed due to complexities/Impairments listed. [] Progression has been slowed due to co-morbidities. [] Plan just implemented, too soon to assess goals progression <30days   [] Goals require adjustment due to lack of progress  [] Patient is not progressing as expected and requires additional follow up with physician  [] Other    Prognosis for POC: [x] Good [] Fair  [] Poor      Patient requires continued skilled intervention: [x] Yes  [] No    Treatment/Activity Tolerance:  [x] Patient able to complete treatment  [] Patient limited by fatigue  [] Patient limited by pain    [] Patient limited by other medical complications  [] Other:       PLAN:   [x] Continue per plan of care [] Alter current plan (see comments above)  [] Plan of care initiated [] Hold pending MD visit [] Discharge    Electronically signed by:  Sukh Leal PTA , ATC  Note: If patient does not return for scheduled/ recommended follow up visits, this note will serve as a discharge from care along with most recent update on progress.

## 2022-06-08 ENCOUNTER — HOSPITAL ENCOUNTER (OUTPATIENT)
Age: 52
Discharge: HOME | End: 2022-06-08
Payer: COMMERCIAL

## 2022-06-08 LAB
INR BLD: 1.01 (ref 0.87–1.14)
PROTHROMBIN TIME: 13.1 SEC (ref 11.7–14.5)

## 2022-06-08 PROCEDURE — 80053 COMPREHEN METABOLIC PANEL: CPT

## 2022-06-08 PROCEDURE — 85025 COMPLETE CBC W/AUTO DIFF WBC: CPT

## 2022-06-08 PROCEDURE — 85610 PROTHROMBIN TIME: CPT

## 2022-06-08 PROCEDURE — 82652 VIT D 1 25-DIHYDROXY: CPT

## 2022-06-08 PROCEDURE — 83690 ASSAY OF LIPASE: CPT

## 2022-06-09 LAB
A/G RATIO: 1.2 (ref 1.1–2.2)
ALBUMIN SERPL-MCNC: 4.1 G/DL (ref 3.4–5)
ALP BLD-CCNC: 178 U/L (ref 40–129)
ALT SERPL-CCNC: 20 U/L (ref 10–40)
ANION GAP SERPL CALCULATED.3IONS-SCNC: 13 MMOL/L (ref 3–16)
AST SERPL-CCNC: 24 U/L (ref 15–37)
BASOPHILS ABSOLUTE: 0.1 K/UL (ref 0–0.2)
BASOPHILS RELATIVE PERCENT: 0.9 %
BILIRUB SERPL-MCNC: <0.2 MG/DL (ref 0–1)
BUN BLDV-MCNC: 22 MG/DL (ref 7–20)
CALCIUM SERPL-MCNC: 9.4 MG/DL (ref 8.3–10.6)
CHLORIDE BLD-SCNC: 100 MMOL/L (ref 99–110)
CO2: 23 MMOL/L (ref 21–32)
CREAT SERPL-MCNC: 1 MG/DL (ref 0.9–1.3)
EOSINOPHILS ABSOLUTE: 0.3 K/UL (ref 0–0.6)
EOSINOPHILS RELATIVE PERCENT: 3.9 %
GFR AFRICAN AMERICAN: >60
GFR NON-AFRICAN AMERICAN: >60
GLUCOSE BLD-MCNC: 90 MG/DL (ref 70–99)
HCT VFR BLD CALC: 37.1 % (ref 40.5–52.5)
HEMOGLOBIN: 12.5 G/DL (ref 13.5–17.5)
LIPASE: 83 U/L (ref 13–60)
LYMPHOCYTES ABSOLUTE: 1.6 K/UL (ref 1–5.1)
LYMPHOCYTES RELATIVE PERCENT: 20 %
MCH RBC QN AUTO: 29 PG (ref 26–34)
MCHC RBC AUTO-ENTMCNC: 33.7 G/DL (ref 31–36)
MCV RBC AUTO: 86.1 FL (ref 80–100)
MONOCYTES ABSOLUTE: 0.7 K/UL (ref 0–1.3)
MONOCYTES RELATIVE PERCENT: 8.2 %
NEUTROPHILS ABSOLUTE: 5.5 K/UL (ref 1.7–7.7)
NEUTROPHILS RELATIVE PERCENT: 67 %
PDW BLD-RTO: 14.3 % (ref 12.4–15.4)
PLATELET # BLD: 324 K/UL (ref 135–450)
PMV BLD AUTO: 7.3 FL (ref 5–10.5)
POTASSIUM SERPL-SCNC: 4 MMOL/L (ref 3.5–5.1)
RBC # BLD: 4.31 M/UL (ref 4.2–5.9)
SODIUM BLD-SCNC: 136 MMOL/L (ref 136–145)
TOTAL PROTEIN: 7.6 G/DL (ref 6.4–8.2)
WBC # BLD: 8.3 K/UL (ref 4–11)

## 2022-06-11 LAB — VITAMIN D 1,25-DIHYDROXY: 56.7 PG/ML (ref 19.9–79.3)

## 2024-07-20 ENCOUNTER — HOSPITAL ENCOUNTER (OUTPATIENT)
Dept: CT IMAGING | Age: 54
Discharge: HOME | End: 2024-07-20
Attending: INTERNAL MEDICINE

## 2024-07-20 DIAGNOSIS — K50.90 CROHN'S DISEASE WITHOUT COMPLICATION, UNSPECIFIED GASTROINTESTINAL TRACT LOCATION (HCC): ICD-10-CM
